# Patient Record
Sex: FEMALE | Race: OTHER | HISPANIC OR LATINO | URBAN - METROPOLITAN AREA
[De-identification: names, ages, dates, MRNs, and addresses within clinical notes are randomized per-mention and may not be internally consistent; named-entity substitution may affect disease eponyms.]

---

## 2017-01-28 VITALS
OXYGEN SATURATION: 100 % | HEART RATE: 95 BPM | HEIGHT: 62 IN | DIASTOLIC BLOOD PRESSURE: 96 MMHG | TEMPERATURE: 98 F | WEIGHT: 132.06 LBS | SYSTOLIC BLOOD PRESSURE: 183 MMHG | RESPIRATION RATE: 16 BRPM

## 2017-01-28 LAB
BASOPHILS # BLD AUTO: 0.1 K/UL — SIGNIFICANT CHANGE UP (ref 0–0.2)
BASOPHILS NFR BLD AUTO: 0.8 % — SIGNIFICANT CHANGE UP (ref 0–2)
EOSINOPHIL # BLD AUTO: 0.5 K/UL — SIGNIFICANT CHANGE UP (ref 0–0.5)
EOSINOPHIL NFR BLD AUTO: 5 % — SIGNIFICANT CHANGE UP (ref 0–6)
HCT VFR BLD CALC: 37.3 % — SIGNIFICANT CHANGE UP (ref 34.5–45)
HGB BLD-MCNC: 13 G/DL — SIGNIFICANT CHANGE UP (ref 11.5–15.5)
LYMPHOCYTES # BLD AUTO: 3.4 K/UL — HIGH (ref 1–3.3)
LYMPHOCYTES # BLD AUTO: 31.8 % — SIGNIFICANT CHANGE UP (ref 13–44)
MCHC RBC-ENTMCNC: 32.3 PG — SIGNIFICANT CHANGE UP (ref 27–34)
MCHC RBC-ENTMCNC: 35 GM/DL — SIGNIFICANT CHANGE UP (ref 32–36)
MCV RBC AUTO: 92.3 FL — SIGNIFICANT CHANGE UP (ref 80–100)
MONOCYTES # BLD AUTO: 1.1 K/UL — HIGH (ref 0–0.9)
MONOCYTES NFR BLD AUTO: 9.9 % — SIGNIFICANT CHANGE UP (ref 2–14)
NEUTROPHILS # BLD AUTO: 5.6 K/UL — SIGNIFICANT CHANGE UP (ref 1.8–7.4)
NEUTROPHILS NFR BLD AUTO: 52.5 % — SIGNIFICANT CHANGE UP (ref 43–77)
PLATELET # BLD AUTO: 214 K/UL — SIGNIFICANT CHANGE UP (ref 150–400)
RBC # BLD: 4.04 M/UL — SIGNIFICANT CHANGE UP (ref 3.8–5.2)
RBC # FLD: 11.8 % — SIGNIFICANT CHANGE UP (ref 10.3–14.5)
WBC # BLD: 10.7 K/UL — HIGH (ref 3.8–10.5)
WBC # FLD AUTO: 10.7 K/UL — HIGH (ref 3.8–10.5)

## 2017-01-28 RX ORDER — ASPIRIN/CALCIUM CARB/MAGNESIUM 324 MG
162 TABLET ORAL ONCE
Qty: 0 | Refills: 0 | Status: DISCONTINUED | OUTPATIENT
Start: 2017-01-28 | End: 2017-01-28

## 2017-01-28 RX ORDER — ASPIRIN/CALCIUM CARB/MAGNESIUM 324 MG
162 TABLET ORAL ONCE
Qty: 0 | Refills: 0 | Status: COMPLETED | OUTPATIENT
Start: 2017-01-28 | End: 2017-01-28

## 2017-01-28 RX ADMIN — Medication 162 MILLIGRAM(S): at 23:42

## 2017-01-28 NOTE — ED PROVIDER NOTE - OBJECTIVE STATEMENT
66 F pmh brain tumor s/p resection presents with 2 days of intermittent chest pressure and shortness of breath.  Chest pressure is not associated with exertion.  Pain is sternal, lasts 2-3 hours.  Pt states pain does not radiate, but states she is experiencing back of the neck pain.  Pt took 2 baby aspirin during the day without relief.  Pt has had prior episodes like this and was worked up and received stress tests that never showed anything.  Pt's father  of MI at age 59.  Pt denies fever/chills, n/v/d, headache, dizziness.    - Lindy Munroe DO

## 2017-01-28 NOTE — ED PROVIDER NOTE - PHYSICAL EXAMINATION
Gen: NAD, AOx3  Head: NCAT  HEENT: PERRL, oral mucosa moist, normal conjunctiva  Lung: CTAB, shallow breathing  CV: rrr, no murmurs, Normal perfusion  Abd: soft, NTND  MSK: No edema, no visible deformities  Neuro: No focal neurologic deficits  Skin: No rash   Psych: anxious  - Lindy Munroe, DO

## 2017-01-28 NOTE — ED ADULT NURSE NOTE - OBJECTIVE STATEMENT
Presents c/o chest tightness x2 days. Pt took BP at home and it was 180/90. Reports feeling a constant pressure and difficulty breathing. Denies ha/numbness/tingling/blurry vision. No n/v/d. Presents c/o chest tightness x2 days. Pt took BP at home and it was 180/90. Reports feeling a constant pressure and difficulty breathing. Denies ha/numbness/tingling/blurry vision. No n/v/d. Pt A&Ox3. Ambulates. C/o persistent chest pressure and diff breathing. Abd soft. Skin WDI.

## 2017-01-28 NOTE — ED PROVIDER NOTE - PROGRESS NOTE DETAILS
------------ATTENDING NOTE------------   signed out to me, chest pain resolved, initial cardiac markers w/o NSTEMI, plan for CDU for continued cardiac markers, stress in AM - Obi Luna MD   ----------------------------------------------------------

## 2017-01-28 NOTE — ED PROVIDER NOTE - ATTENDING CONTRIBUTION TO CARE
66 F pmhx benign brain tumor, s/p resection, presents for 2 days of intermittent chest pressure and shortness of breath.  Pain is sternal, lasts 2-3 hours.  Pt states pain does not radiate, but states she is experiencing back of the neck pain. had stress test in 2013. non smoker, never smoker. Pt denies fever/chills, n/v/d, headache, dizziness  Gen. no acute distress, Non toxic   HEENT: EOMI, mmm,   Lungs: CTAB/L no C/ W /R   CVS: S1S2   Abd; Soft non tender, non distended   Ext: no edema   Neuro AAOx3 non focal clear speech

## 2017-01-29 ENCOUNTER — OUTPATIENT (OUTPATIENT)
Dept: EMERGENCY DEPT | Facility: HOSPITAL | Age: 67
LOS: 1 days | End: 2017-01-29
Payer: MEDICARE

## 2017-01-29 DIAGNOSIS — R07.9 CHEST PAIN, UNSPECIFIED: ICD-10-CM

## 2017-01-29 DIAGNOSIS — D32.0 BENIGN NEOPLASM OF CEREBRAL MENINGES: ICD-10-CM

## 2017-01-29 DIAGNOSIS — D18.02 HEMANGIOMA OF INTRACRANIAL STRUCTURES: Chronic | ICD-10-CM

## 2017-01-29 DIAGNOSIS — I20.0 UNSTABLE ANGINA: ICD-10-CM

## 2017-01-29 DIAGNOSIS — Z29.9 ENCOUNTER FOR PROPHYLACTIC MEASURES, UNSPECIFIED: ICD-10-CM

## 2017-01-29 LAB
ALBUMIN SERPL ELPH-MCNC: 4.3 G/DL — SIGNIFICANT CHANGE UP (ref 3.3–5)
ALP SERPL-CCNC: 102 U/L — SIGNIFICANT CHANGE UP (ref 40–120)
ALT FLD-CCNC: 25 U/L RC — SIGNIFICANT CHANGE UP (ref 10–45)
ANION GAP SERPL CALC-SCNC: 18 MMOL/L — HIGH (ref 5–17)
AST SERPL-CCNC: 51 U/L — HIGH (ref 10–40)
BILIRUB SERPL-MCNC: 0.4 MG/DL — SIGNIFICANT CHANGE UP (ref 0.2–1.2)
BUN SERPL-MCNC: 17 MG/DL — SIGNIFICANT CHANGE UP (ref 7–23)
CALCIUM SERPL-MCNC: 9.9 MG/DL — SIGNIFICANT CHANGE UP (ref 8.4–10.5)
CHLORIDE SERPL-SCNC: 100 MMOL/L — SIGNIFICANT CHANGE UP (ref 96–108)
CHOLEST SERPL-MCNC: 238 MG/DL — HIGH (ref 10–199)
CK MB BLD-MCNC: 0.4 % — SIGNIFICANT CHANGE UP (ref 0–3.5)
CK MB BLD-MCNC: 0.4 % — SIGNIFICANT CHANGE UP (ref 0–3.5)
CK MB CFR SERPL CALC: 4.6 NG/ML — HIGH (ref 0–3.8)
CK MB CFR SERPL CALC: 5.6 NG/ML — HIGH (ref 0–3.8)
CK SERPL-CCNC: 1236 U/L — HIGH (ref 25–170)
CK SERPL-CCNC: 1522 U/L — HIGH (ref 25–170)
CO2 SERPL-SCNC: 23 MMOL/L — SIGNIFICANT CHANGE UP (ref 22–31)
CREAT SERPL-MCNC: 1.05 MG/DL — SIGNIFICANT CHANGE UP (ref 0.5–1.3)
GLUCOSE SERPL-MCNC: 122 MG/DL — HIGH (ref 70–99)
HBA1C BLD-MCNC: 5.9 % — HIGH (ref 4–5.6)
HDLC SERPL-MCNC: 56 MG/DL — SIGNIFICANT CHANGE UP (ref 40–125)
INR BLD: 0.98 RATIO — SIGNIFICANT CHANGE UP (ref 0.88–1.16)
LIPID PNL WITH DIRECT LDL SERPL: 153 MG/DL — HIGH
POTASSIUM SERPL-MCNC: 4.4 MMOL/L — SIGNIFICANT CHANGE UP (ref 3.5–5.3)
POTASSIUM SERPL-SCNC: 4.4 MMOL/L — SIGNIFICANT CHANGE UP (ref 3.5–5.3)
PROT SERPL-MCNC: 7.9 G/DL — SIGNIFICANT CHANGE UP (ref 6–8.3)
PROTHROM AB SERPL-ACNC: 10.7 SEC — SIGNIFICANT CHANGE UP (ref 10–13.1)
SODIUM SERPL-SCNC: 141 MMOL/L — SIGNIFICANT CHANGE UP (ref 135–145)
TOTAL CHOLESTEROL/HDL RATIO MEASUREMENT: 4.2 RATIO — SIGNIFICANT CHANGE UP (ref 3.3–7.1)
TRIGL SERPL-MCNC: 145 MG/DL — SIGNIFICANT CHANGE UP (ref 10–149)
TROPONIN T SERPL-MCNC: <0.01 NG/ML — SIGNIFICANT CHANGE UP (ref 0–0.06)
TROPONIN T SERPL-MCNC: <0.01 NG/ML — SIGNIFICANT CHANGE UP (ref 0–0.06)

## 2017-01-29 RX ORDER — SODIUM CHLORIDE 9 MG/ML
3 INJECTION INTRAMUSCULAR; INTRAVENOUS; SUBCUTANEOUS EVERY 8 HOURS
Qty: 0 | Refills: 0 | Status: DISCONTINUED | OUTPATIENT
Start: 2017-01-29 | End: 2017-01-30

## 2017-01-29 RX ORDER — DEXTROSE 50 % IN WATER 50 %
12.5 SYRINGE (ML) INTRAVENOUS ONCE
Qty: 0 | Refills: 0 | Status: DISCONTINUED | OUTPATIENT
Start: 2017-01-29 | End: 2017-01-29

## 2017-01-29 RX ORDER — INSULIN LISPRO 100/ML
VIAL (ML) SUBCUTANEOUS
Qty: 0 | Refills: 0 | Status: DISCONTINUED | OUTPATIENT
Start: 2017-01-29 | End: 2017-01-29

## 2017-01-29 RX ORDER — ASPIRIN/CALCIUM CARB/MAGNESIUM 324 MG
81 TABLET ORAL DAILY
Qty: 0 | Refills: 0 | Status: DISCONTINUED | OUTPATIENT
Start: 2017-01-29 | End: 2017-01-30

## 2017-01-29 RX ORDER — SODIUM CHLORIDE 9 MG/ML
1000 INJECTION, SOLUTION INTRAVENOUS
Qty: 0 | Refills: 0 | Status: DISCONTINUED | OUTPATIENT
Start: 2017-01-29 | End: 2017-01-29

## 2017-01-29 RX ORDER — DEXTROSE 50 % IN WATER 50 %
1 SYRINGE (ML) INTRAVENOUS ONCE
Qty: 0 | Refills: 0 | Status: DISCONTINUED | OUTPATIENT
Start: 2017-01-29 | End: 2017-01-29

## 2017-01-29 RX ORDER — DEXTROSE 50 % IN WATER 50 %
25 SYRINGE (ML) INTRAVENOUS ONCE
Qty: 0 | Refills: 0 | Status: DISCONTINUED | OUTPATIENT
Start: 2017-01-29 | End: 2017-01-29

## 2017-01-29 RX ORDER — HEPARIN SODIUM 5000 [USP'U]/ML
5000 INJECTION INTRAVENOUS; SUBCUTANEOUS EVERY 12 HOURS
Qty: 0 | Refills: 0 | Status: DISCONTINUED | OUTPATIENT
Start: 2017-01-29 | End: 2017-01-30

## 2017-01-29 RX ADMIN — SODIUM CHLORIDE 3 MILLILITER(S): 9 INJECTION INTRAMUSCULAR; INTRAVENOUS; SUBCUTANEOUS at 23:22

## 2017-01-29 RX ADMIN — SODIUM CHLORIDE 3 MILLILITER(S): 9 INJECTION INTRAMUSCULAR; INTRAVENOUS; SUBCUTANEOUS at 16:30

## 2017-01-29 RX ADMIN — SODIUM CHLORIDE 3 MILLILITER(S): 9 INJECTION INTRAMUSCULAR; INTRAVENOUS; SUBCUTANEOUS at 06:39

## 2017-01-29 NOTE — ED ADULT NURSE REASSESSMENT NOTE - PERIPHERAL VASCULAR WDL
Pulses equal bilaterally, no edema present.

## 2017-01-29 NOTE — H&P ADULT. - PROBLEM SELECTOR PLAN 1
troponin negative x2. Relatively atypical symptoms but abnormal stress test. Plan to have cath tomorrow, possible need for stent. Discussed possibility at great length with patient. Including possible need to start DAPT and statin.  -Cont ASA  -NPO after MN for cath  -F/u cardiology recommendations

## 2017-01-29 NOTE — ED ADULT NURSE REASSESSMENT NOTE - NEURO WDL
Alert and oriented to person, place and time, memory intact, behavior appropriate to situation, PERRL.

## 2017-01-29 NOTE — ED CDU PROVIDER NOTE - OBJECTIVE STATEMENT
65y/o F with pmh brain tumor s/p resection c/o intermittent chest pressure/heaviness and shortness of breath x3 days.  Chest pressure is not associated with exertion.  Pain is sternal, lasts 2-3 hours.  Pt states pain does not radiate, but states she is experiencing back of the neck pain.  Pt took 2 baby aspirin during the day without relief.  Pt has had prior episode like this 4 years ago  and had a negative stress test at that time.  Pt's father  of MI at age 59.  Pt denies fever/chills, n/v/d, headache, dizziness, back pain, abd pain, problems going to the bathroom or walking.

## 2017-01-29 NOTE — ED CDU PROVIDER NOTE - DETAILS
CHEST PAIN  -[facility]  -[facility]  -[facility] EVAL  -STRESS TEST  -CASE D/W ATTENDING Dr. RONI Luna

## 2017-01-29 NOTE — ED CDU PROVIDER NOTE - PLAN OF CARE
1. Recommend Aspirin 81mg over the counter daily until further evaluation.  Take all of your other medications as previously prescribed.   2. Follow up with your PMD and/or cardiologist within 48-72 hours. Show copies of your reports given to you.   3. Worsening or continued chest pain, shortness of breath, weakness, return to ED.

## 2017-01-29 NOTE — ED CDU PROVIDER NOTE - PROGRESS NOTE DETAILS
CDU NOTE VIPIN DALAL: Pt resting comfortably, feeling well without complaint. NAD, VSS. No events on telemetry. CDU NOTE VIPIN YOUNG: Pt resting comfortably, feeling well without complaint. NAD, VSS. No events on telemetry. Awaiting stress test at this time. CDU NOTE VIPIN YOUNG: Pt resting comfortably, feeling well without complaint. NAD, VSS. No events on telemetry. Awaiting second portion of stress test at this time. Received call from Dr. Brandon stating that patient has positive stress test with multiple areas of reversible ischemia shown. Patient will need to be admitted for cardiac catheterization. Discussed case and plan with patient with Dr. Maurice, and will admit to Dr. Hadley for further work-up. Patient in understanding and agreement. -Kaylyn Bartlett PA-C jamel:  I have personally performed a face to face diagnostic evaluation on this patient.  I have reviewed the ACP note and agree with the history, exam, and plan of care.  +  abnormal stress.  pt asymptomatic now.  admit to cardiology. CDU NOTE VIPIN DALAL: Pt resting comfortably, feeling well without complaint. NAD, VSS. No events on telemetry. pt is admitted to medicine team since about 1610.

## 2017-01-29 NOTE — ED ADULT NURSE REASSESSMENT NOTE - MUSCULOSKELETAL WDL
Full range of motion of upper and lower extremities, no joint tenderness/swelling.

## 2017-01-29 NOTE — ED CDU PROVIDER NOTE - FAMILY HISTORY
Father  Still living? Unknown  Family history of heart attack, Age at diagnosis: Age Unknown     Mother  Still living? Unknown  Family history of Alzheimer's disease, Age at diagnosis: Age Unknown

## 2017-01-29 NOTE — ED ADULT NURSE REASSESSMENT NOTE - INTEGUMENTARY WDL
Color consistent with ethnicity/race, warm, dry intact, resilient.

## 2017-01-29 NOTE — ED ADULT NURSE REASSESSMENT NOTE - COMFORT CARE
plan of care explained/po fluids offered
ambulated to bathroom/plan of care explained
plan of care explained

## 2017-01-29 NOTE — H&P ADULT. - FAMILY HISTORY
<<-----Click on this checkbox to enter Family History Family history of heart attack     Father  Still living? Unknown  Family history of Alzheimer's disease, Age at diagnosis: Age Unknown

## 2017-01-29 NOTE — ED CDU PROVIDER NOTE - ATTENDING CONTRIBUTION TO CARE
-----------ATTENDING NOTE------------   signed out to me, chest pain resolved, initial cardiac markers w/o NSTEMI, plan for CDU for continued cardiac markers, stress in AM -- continue to agree w/ PA's documentation and Hx&PE and Assessment/Plan.   - Obi Luna MD   ----------------------------------------------------------

## 2017-01-29 NOTE — ED ADULT NURSE REASSESSMENT NOTE - ANCILLARY STATUS
cardiovascular tests pending/awaiting stress test/EKG at bedside/awaiting lab draw
awaiting radiology
cardiovascular tests pending

## 2017-01-29 NOTE — H&P ADULT. - ASSESSMENT
66F w/ hx of meningioma s/p resection p/w chest pain. Pt states she will occasionally get chest tightness associated with SOB for several minutes not associated with exertion

## 2017-01-29 NOTE — H&P ADULT. - HISTORY OF PRESENT ILLNESS
66F w/ hx of meningioma s/p resection p/w chest pain. Pt states she will occasionally get chest tightness associated with SOB for several minutes not associated with exertion. Pt states these episodes will happen while she is watching TV but not happen while walking up the stairs. 3 days ago pt noted the symptoms persistently for just under 2 hours, became concerned and went to get evaluated. Reportedly negative chest pain work up 4 years ago as per patient. Pt denies any hx of asthma, got inhaler for these symptoms in the past with no significant improvement. No orthopnea, hx of smoking, dizziness, nausea, vomiting, lower extremity edema. Father  of MI late 50s or early 60s.    In ER: Given ASA 162mg

## 2017-01-29 NOTE — ED ADULT NURSE REASSESSMENT NOTE - GENITOURINARY WDL
Bladder non-tender and non-distended. Urine clear yellow.

## 2017-01-29 NOTE — ED ADULT NURSE REASSESSMENT NOTE - RESPIRATORY WDL
Breathing spontaneous and unlabored. Breath sounds clear and equal bilaterally with regular rhythm.

## 2017-01-30 VITALS — DIASTOLIC BLOOD PRESSURE: 75 MMHG | RESPIRATION RATE: 16 BRPM | HEART RATE: 78 BPM | SYSTOLIC BLOOD PRESSURE: 129 MMHG

## 2017-01-30 LAB
ANION GAP SERPL CALC-SCNC: 16 MMOL/L — SIGNIFICANT CHANGE UP (ref 5–17)
BASOPHILS # BLD AUTO: 0.05 K/UL — SIGNIFICANT CHANGE UP (ref 0–0.2)
BASOPHILS NFR BLD AUTO: 0.6 % — SIGNIFICANT CHANGE UP (ref 0–2)
BUN SERPL-MCNC: 17 MG/DL — SIGNIFICANT CHANGE UP (ref 7–23)
CALCIUM SERPL-MCNC: 10.2 MG/DL — SIGNIFICANT CHANGE UP (ref 8.4–10.5)
CHLORIDE SERPL-SCNC: 101 MMOL/L — SIGNIFICANT CHANGE UP (ref 96–108)
CO2 SERPL-SCNC: 23 MMOL/L — SIGNIFICANT CHANGE UP (ref 22–31)
CREAT SERPL-MCNC: 1.06 MG/DL — SIGNIFICANT CHANGE UP (ref 0.5–1.3)
EOSINOPHIL # BLD AUTO: 0.51 K/UL — HIGH (ref 0–0.5)
EOSINOPHIL NFR BLD AUTO: 5.9 % — SIGNIFICANT CHANGE UP (ref 0–6)
GLUCOSE SERPL-MCNC: 114 MG/DL — HIGH (ref 70–99)
HCT VFR BLD CALC: 42.6 % — SIGNIFICANT CHANGE UP (ref 34.5–45)
HGB BLD-MCNC: 14.3 G/DL — SIGNIFICANT CHANGE UP (ref 11.5–15.5)
IMM GRANULOCYTES NFR BLD AUTO: 0.1 % — SIGNIFICANT CHANGE UP (ref 0–1.5)
LYMPHOCYTES # BLD AUTO: 3.19 K/UL — SIGNIFICANT CHANGE UP (ref 1–3.3)
LYMPHOCYTES # BLD AUTO: 37 % — SIGNIFICANT CHANGE UP (ref 13–44)
MAGNESIUM SERPL-MCNC: 2.3 MG/DL — SIGNIFICANT CHANGE UP (ref 1.6–2.6)
MCHC RBC-ENTMCNC: 30.9 PG — SIGNIFICANT CHANGE UP (ref 27–34)
MCHC RBC-ENTMCNC: 33.6 GM/DL — SIGNIFICANT CHANGE UP (ref 32–36)
MCV RBC AUTO: 92 FL — SIGNIFICANT CHANGE UP (ref 80–100)
MONOCYTES # BLD AUTO: 0.82 K/UL — SIGNIFICANT CHANGE UP (ref 0–0.9)
MONOCYTES NFR BLD AUTO: 9.5 % — SIGNIFICANT CHANGE UP (ref 2–14)
NEUTROPHILS # BLD AUTO: 4.05 K/UL — SIGNIFICANT CHANGE UP (ref 1.8–7.4)
NEUTROPHILS NFR BLD AUTO: 46.9 % — SIGNIFICANT CHANGE UP (ref 43–77)
PLATELET # BLD AUTO: 267 K/UL — SIGNIFICANT CHANGE UP (ref 150–400)
POTASSIUM SERPL-MCNC: 4.4 MMOL/L — SIGNIFICANT CHANGE UP (ref 3.5–5.3)
POTASSIUM SERPL-SCNC: 4.4 MMOL/L — SIGNIFICANT CHANGE UP (ref 3.5–5.3)
RBC # BLD: 4.63 M/UL — SIGNIFICANT CHANGE UP (ref 3.8–5.2)
RBC # FLD: 12.6 % — SIGNIFICANT CHANGE UP (ref 10.3–14.5)
SODIUM SERPL-SCNC: 140 MMOL/L — SIGNIFICANT CHANGE UP (ref 135–145)
WBC # BLD: 8.63 K/UL — SIGNIFICANT CHANGE UP (ref 3.8–10.5)
WBC # FLD AUTO: 8.63 K/UL — SIGNIFICANT CHANGE UP (ref 3.8–10.5)

## 2017-01-30 PROCEDURE — 93005 ELECTROCARDIOGRAM TRACING: CPT | Mod: 76

## 2017-01-30 PROCEDURE — C1887: CPT

## 2017-01-30 PROCEDURE — 93017 CV STRESS TEST TRACING ONLY: CPT

## 2017-01-30 PROCEDURE — C1894: CPT

## 2017-01-30 PROCEDURE — 85027 COMPLETE CBC AUTOMATED: CPT

## 2017-01-30 PROCEDURE — 71045 X-RAY EXAM CHEST 1 VIEW: CPT

## 2017-01-30 PROCEDURE — 82550 ASSAY OF CK (CPK): CPT

## 2017-01-30 PROCEDURE — 85610 PROTHROMBIN TIME: CPT

## 2017-01-30 PROCEDURE — 80061 LIPID PANEL: CPT

## 2017-01-30 PROCEDURE — 80048 BASIC METABOLIC PNL TOTAL CA: CPT

## 2017-01-30 PROCEDURE — G0378: CPT

## 2017-01-30 PROCEDURE — C1769: CPT

## 2017-01-30 PROCEDURE — 99285 EMERGENCY DEPT VISIT HI MDM: CPT | Mod: 25

## 2017-01-30 PROCEDURE — 80053 COMPREHEN METABOLIC PANEL: CPT

## 2017-01-30 PROCEDURE — 83036 HEMOGLOBIN GLYCOSYLATED A1C: CPT

## 2017-01-30 PROCEDURE — 71250 CT THORAX DX C-: CPT | Mod: 26

## 2017-01-30 PROCEDURE — 82553 CREATINE MB FRACTION: CPT

## 2017-01-30 PROCEDURE — A9500: CPT

## 2017-01-30 PROCEDURE — 84484 ASSAY OF TROPONIN QUANT: CPT

## 2017-01-30 PROCEDURE — 83735 ASSAY OF MAGNESIUM: CPT

## 2017-01-30 PROCEDURE — 93458 L HRT ARTERY/VENTRICLE ANGIO: CPT

## 2017-01-30 PROCEDURE — 78452 HT MUSCLE IMAGE SPECT MULT: CPT

## 2017-01-30 PROCEDURE — 71250 CT THORAX DX C-: CPT

## 2017-01-30 RX ORDER — CLOPIDOGREL BISULFATE 75 MG/1
600 TABLET, FILM COATED ORAL ONCE
Qty: 0 | Refills: 0 | Status: COMPLETED | OUTPATIENT
Start: 2017-01-30 | End: 2017-01-30

## 2017-01-30 RX ORDER — ATORVASTATIN CALCIUM 80 MG/1
20 TABLET, FILM COATED ORAL AT BEDTIME
Qty: 0 | Refills: 0 | Status: DISCONTINUED | OUTPATIENT
Start: 2017-01-30 | End: 2017-01-30

## 2017-01-30 RX ORDER — METOPROLOL TARTRATE 50 MG
1 TABLET ORAL
Qty: 30 | Refills: 1 | OUTPATIENT
Start: 2017-01-30 | End: 2017-03-30

## 2017-01-30 RX ORDER — ATORVASTATIN CALCIUM 80 MG/1
1 TABLET, FILM COATED ORAL
Qty: 0 | Refills: 0 | COMMUNITY
Start: 2017-01-30

## 2017-01-30 RX ORDER — METOPROLOL TARTRATE 50 MG
1 TABLET ORAL
Qty: 0 | Refills: 0 | COMMUNITY
Start: 2017-01-30

## 2017-01-30 RX ORDER — METOPROLOL TARTRATE 50 MG
25 TABLET ORAL DAILY
Qty: 0 | Refills: 0 | Status: DISCONTINUED | OUTPATIENT
Start: 2017-01-30 | End: 2017-01-30

## 2017-01-30 RX ORDER — ACETAMINOPHEN 500 MG
650 TABLET ORAL ONCE
Qty: 0 | Refills: 0 | Status: COMPLETED | OUTPATIENT
Start: 2017-01-30 | End: 2017-01-30

## 2017-01-30 RX ADMIN — HEPARIN SODIUM 5000 UNIT(S): 5000 INJECTION INTRAVENOUS; SUBCUTANEOUS at 06:11

## 2017-01-30 RX ADMIN — Medication 650 MILLIGRAM(S): at 20:21

## 2017-01-30 RX ADMIN — SODIUM CHLORIDE 3 MILLILITER(S): 9 INJECTION INTRAMUSCULAR; INTRAVENOUS; SUBCUTANEOUS at 06:15

## 2017-01-30 RX ADMIN — Medication 81 MILLIGRAM(S): at 10:15

## 2017-01-30 RX ADMIN — Medication 25 MILLIGRAM(S): at 12:44

## 2017-01-30 RX ADMIN — SODIUM CHLORIDE 3 MILLILITER(S): 9 INJECTION INTRAMUSCULAR; INTRAVENOUS; SUBCUTANEOUS at 15:01

## 2017-01-30 RX ADMIN — CLOPIDOGREL BISULFATE 600 MILLIGRAM(S): 75 TABLET, FILM COATED ORAL at 10:15

## 2017-01-30 NOTE — DISCHARGE NOTE ADULT - MEDICATION SUMMARY - MEDICATIONS TO TAKE
I will START or STAY ON the medications listed below when I get home from the hospital:    aspirin 81 mg oral tablet, chewable  -- 1 tab(s) by mouth once a day  -- Indication: For Caediac Protection     metoprolol succinate 25 mg oral tablet, extended release  -- 1 tab(s) by mouth once a day  -- Indication: For Hypertension     Cod Liver oral oil  -- 5 milliliter(s) by mouth once a day  -- Indication: For Supplement     Centrum Women's  -- 1 tab(s) by mouth once a day  -- Indication: For Supplement I will START or STAY ON the medications listed below when I get home from the hospital:    aspirin 81 mg oral tablet, chewable  -- 1 tab(s) by mouth once a day  -- Indication: For Caediac Protection     metoprolol succinate 25 mg oral tablet, extended release  -- 1 tab(s) by mouth once a day MDD:i tab  -- Indication: For Hypertension     Cod Liver oral oil  -- 5 milliliter(s) by mouth once a day  -- Indication: For Supplement     Centrum Women's  -- 1 tab(s) by mouth once a day  -- Indication: For Supplement

## 2017-01-30 NOTE — DISCHARGE NOTE ADULT - PLAN OF CARE
you will be free of chest pain Low salt, low fat diet.   Weight management.   Take medications as prescribed.    No smoking.  Follow up appointments with your doctor(s)  as instructed No heavy lifting, strenuous activity, bending, straining or unnecessary stair climbing  for 2 weeks. No sex for 1 week.  No driving for 2 days. You may shower 24 hours following procedure but avoid baths and swimming for 1 week. Check groin site for bleeding and/or swelling daily following procedure. Call your doctor/cardiologist immediately should it occur or if you have increased/persistent pain at the site. Follow up with your cardiologist in 1- 2 weeks. You may call [facility] at [phone] or [phone] after office hours and weekends  with any questions or concerns following your procedure. Take medications as prescribed.

## 2017-01-30 NOTE — DISCHARGE NOTE ADULT - CARE PLAN
Principal Discharge DX:	Chest pain with minimal risk for cardiac etiology  Goal:	you will be free of chest pain  Instructions for follow-up, activity and diet:	Low salt, low fat diet.   Weight management.   Take medications as prescribed.    No smoking.  Follow up appointments with your doctor(s)  as instructed Principal Discharge DX:	Chest pain with minimal risk for cardiac etiology  Goal:	you will be free of chest pain  Instructions for follow-up, activity and diet:	No heavy lifting, strenuous activity, bending, straining or unnecessary stair climbing  for 2 weeks. No sex for 1 week.  No driving for 2 days. You may shower 24 hours following procedure but avoid baths and swimming for 1 week. Check groin site for bleeding and/or swelling daily following procedure. Call your doctor/cardiologist immediately should it occur or if you have increased/persistent pain at the site. Follow up with your cardiologist in 1- 2 weeks. You may call [facility] at [phone] or [phone] after office hours and weekends  with any questions or concerns following your procedure. Take medications as prescribed. Principal Discharge DX:	Chest pain with minimal risk for cardiac etiology  Goal:	you will be free of chest pain  Instructions for follow-up, activity and diet:	No heavy lifting, strenuous activity, bending, straining or unnecessary stair climbing  for 2 weeks. No sex for 1 week.  No driving for 2 days. You may shower 24 hours following procedure but avoid baths and swimming for 1 week. Check groin site for bleeding and/or swelling daily following procedure. Call your doctor/cardiologist immediately should it occur or if you have increased/persistent pain at the site. Follow up with your cardiologist in 1- 2 weeks. You may call [facility] at [phone] or [phone] after office hours and weekends  with any questions or concerns following your procedure. Take medications as prescribed. Principal Discharge DX:	Chest pain with minimal risk for cardiac etiology  Goal:	you will be free of chest pain  Instructions for follow-up, activity and diet:	No heavy lifting, strenuous activity, bending, straining or unnecessary stair climbing  for 2 weeks. No sex for 1 week.  No driving for 2 days. You may shower 24 hours following procedure but avoid baths and swimming for 1 week. Check groin site for bleeding and/or swelling daily following procedure. Call your doctor/cardiologist immediately should it occur or if you have increased/persistent pain at the site. Follow up with your cardiologist in 1- 2 weeks. You may call [facility] at [phone] or [phone] after office hours and weekends  with any questions or concerns following your procedure. Take medications as prescribed. Principal Discharge DX:	Chest pain with minimal risk for cardiac etiology  Goal:	you will be free of chest pain  Instructions for follow-up, activity and diet:	No heavy lifting, strenuous activity, bending, straining or unnecessary stair climbing  for 2 weeks. No sex for 1 week.  No driving for 2 days. You may shower 24 hours following procedure but avoid baths and swimming for 1 week. Check groin site for bleeding and/or swelling daily following procedure. Call your doctor/cardiologist immediately should it occur or if you have increased/persistent pain at the site. Follow up with your cardiologist in 1- 2 weeks. You may call [facility] at [phone] or [phone] after office hours and weekends  with any questions or concerns following your procedure. Take medications as prescribed. Principal Discharge DX:	Chest pain with minimal risk for cardiac etiology  Goal:	you will be free of chest pain  Instructions for follow-up, activity and diet:	No heavy lifting, strenuous activity, bending, straining or unnecessary stair climbing  for 2 weeks. No sex for 1 week.  No driving for 2 days. You may shower 24 hours following procedure but avoid baths and swimming for 1 week. Check groin site for bleeding and/or swelling daily following procedure. Call your doctor/cardiologist immediately should it occur or if you have increased/persistent pain at the site. Follow up with your cardiologist in 1- 2 weeks. You may call [facility] at [phone] or [phone] after office hours and weekends  with any questions or concerns following your procedure. Take medications as prescribed. Principal Discharge DX:	Chest pain with minimal risk for cardiac etiology  Goal:	you will be free of chest pain  Instructions for follow-up, activity and diet:	No heavy lifting, strenuous activity, bending, straining or unnecessary stair climbing  for 2 weeks. No sex for 1 week.  No driving for 2 days. You may shower 24 hours following procedure but avoid baths and swimming for 1 week. Check groin site for bleeding and/or swelling daily following procedure. Call your doctor/cardiologist immediately should it occur or if you have increased/persistent pain at the site. Follow up with your cardiologist in 1- 2 weeks. You may call [facility] at [phone] or [phone] after office hours and weekends  with any questions or concerns following your procedure. Take medications as prescribed. Principal Discharge DX:	Chest pain with minimal risk for cardiac etiology  Goal:	you will be free of chest pain  Instructions for follow-up, activity and diet:	No heavy lifting, strenuous activity, bending, straining or unnecessary stair climbing  for 2 weeks. No sex for 1 week.  No driving for 2 days. You may shower 24 hours following procedure but avoid baths and swimming for 1 week. Check groin site for bleeding and/or swelling daily following procedure. Call your doctor/cardiologist immediately should it occur or if you have increased/persistent pain at the site. Follow up with your cardiologist in 1- 2 weeks. You may call [facility] at [phone] or [phone] after office hours and weekends  with any questions or concerns following your procedure. Take medications as prescribed.

## 2017-01-30 NOTE — DISCHARGE NOTE ADULT - ADDITIONAL INSTRUCTIONS
No heavy lifting, strenuous activity, bending, straining, or unnecessary stair climbing for 2 weeks. No driving for 2 days. You may shower 24 hours following the procedure but avoid baths/swimming for 1 week. Check your groin site for bleeding and/or swelling daily following procedure and call your doctor immediately if it occurs or if you experience increased pain at the site. Follow up with your cardiologist in 1-2 weeks. You may call [facility]  if you have any questions/concerns regarding your procedure [phone]. No heavy lifting, strenuous activity, bending, straining, or unnecessary stair climbing for 2 weeks. No driving for 2 days. You may shower 24 hours following the procedure but avoid baths/swimming for 1 week. Check your groin site for bleeding and/or swelling daily following procedure and call your doctor immediately if it occurs or if you experience increased pain at the site. Follow up with Dr. Hadley on Feb 16th at 1pm. You may call [facility]  if you have any questions/concerns regarding your procedure [phone]. No heavy lifting, strenuous activity, bending, straining, or unnecessary stair climbing for 2 weeks. No driving for 2 days. You may shower 24 hours following the procedure but avoid baths/swimming for 1 week. Check your groin site for bleeding and/or swelling daily following procedure and call your doctor immediately if it occurs or if you experience increased pain at the site. Follow up with Dr. Hadley on Feb 16th at 1pm. You may call [facility]  if you have any questions/concerns regarding your procedure [phone]. CTA Chest preliminary report  revealed no emergent finding. Per Dr Jomar fonseca d/c patient home tonight No heavy lifting, strenuous activity, bending, straining, or unnecessary stair climbing for 2 weeks. No driving for 2 days. You may shower 24 hours following the procedure but avoid baths/swimming for 1 week. Check your groin site for bleeding and/or swelling daily following procedure and call your doctor immediately if it occurs or if you experience increased pain at the site. Follow up with Dr. Hadley on Feb 16th at 1pm. You may call [facility]  if you have any questions/concerns regarding your procedure [phone]. CTA Chest preliminary report  revealed no emergent finding. Dr Hadley made aware. Per Dr. Toni fonseca d/c patient home tonight.

## 2017-01-30 NOTE — DISCHARGE NOTE ADULT - CONDITION (STATED IN TERMS THAT PERMIT A SPECIFIC MEASURABLE COMPARISON WITH CONDITION ON ADMISSION):
vital signs are stable at time of discharge. Patient denies chest pain, palpitaions, SOB upon discharge. Groin site stable, warm to touch, +sensation, +2 DP pulse vital signs are stable at time of discharge. Patient denies chest pain, palpitations, SOB upon discharge. Groin site stable, warm to touch, +sensation, +2 DP pulse

## 2017-01-30 NOTE — ED ADULT NURSE REASSESSMENT NOTE - NS ED NURSE REASSESS COMMENT FT1
0700 report taken from change of shift RN. Pt admitted waiting bed assignment for pos stress test. Pt NPO. Pt ambulatory to bathroom. Will continue to monitor.
Pt received from DINO Tristan. Pt oriented to CDU & plan of care was discussed. No complaints of chest pain, SOB, dizziness or palpitations. Safety & comfort measures maintained. Call bell in reach. Will continue to monitor.
Received pt from Sarah RN CDU , received pt alert and responsive, oriented x4, denies any respiratory distress, SOB, or difficulty breathing. Pt transferred to CDU for observation for chest pain , 18 g IV in left arm in place, patent and free of signs of infiltration, placed on continuos cardiac monitoring as ordered, NSR HR in the 70's,  pt denies chest pain or palpitations, V/S stable, pt afebrile, pt denies pain at this time. Pt educated on unit and unit rules, instructed patient to notify RN of any needed assistance, Pt verbalizes understanding, Call bell placed within reach. Safety maintained. Will continue to monitor.
Pt VSS, pt resting comfortably.  Pt awaitng stress test.

## 2017-01-30 NOTE — DISCHARGE NOTE ADULT - HOSPITAL COURSE
66F w/ hx of meningioma s/p resection p/w chest pain. Pt states she will occasionally get chest tightness associated with SOB for several minutes not associated with exertion. Pt states these episodes will happen while she is watching TV but not happen while walking up the stairs. 3 days ago pt noted the symptoms persistently for just under 2 hours, became concerned and went to get evaluated. Reportedly negative chest pain work up 4 years ago as per patient. Pt denies any hx of asthma, got inhaler for these symptoms in the past with no significant improvement. No orthopnea, hx of smoking, dizziness, nausea, vomiting, lower extremity edema. Father  of MI late 50s or early 60s.    s/p diagnostic cath via right femoral artery. Patient tolerated procedure well, no complications.

## 2017-01-30 NOTE — DISCHARGE NOTE ADULT - PATIENT PORTAL LINK FT
“You can access the Follow[employer] Patient Portal, offered by [facility], by registering with the following website: [email or web address]”

## 2017-01-30 NOTE — DISCHARGE NOTE ADULT - CARE PROVIDER_API CALL
Obi Hadley (MD), Cardiovascular Disease; Internal Medicine; Interventional Cardiology; Nuclear Cardiology  [address]  [place] [address]  Phone: [phone]  Fax: [phone]

## 2017-02-02 RX ORDER — IBUPROFEN 200 MG
1 TABLET ORAL
Qty: 0 | Refills: 0 | COMMUNITY

## 2017-06-18 ENCOUNTER — INPATIENT (INPATIENT)
Facility: HOSPITAL | Age: 67
LOS: 2 days | Discharge: HOME | DRG: 392 | End: 2017-06-21
Attending: GENERAL PRACTICE | Admitting: GENERAL PRACTICE
Payer: MEDICARE

## 2017-06-18 VITALS
TEMPERATURE: 98 F | OXYGEN SATURATION: 100 % | HEART RATE: 90 BPM | RESPIRATION RATE: 20 BRPM | SYSTOLIC BLOOD PRESSURE: 180 MMHG | DIASTOLIC BLOOD PRESSURE: 100 MMHG

## 2017-06-18 DIAGNOSIS — D18.02 HEMANGIOMA OF INTRACRANIAL STRUCTURES: Chronic | ICD-10-CM

## 2017-06-18 DIAGNOSIS — R10.84 GENERALIZED ABDOMINAL PAIN: ICD-10-CM

## 2017-06-18 DIAGNOSIS — K62.5 HEMORRHAGE OF ANUS AND RECTUM: ICD-10-CM

## 2017-06-18 DIAGNOSIS — K52.9 NONINFECTIVE GASTROENTERITIS AND COLITIS, UNSPECIFIED: ICD-10-CM

## 2017-06-18 DIAGNOSIS — E87.2 ACIDOSIS: ICD-10-CM

## 2017-06-18 DIAGNOSIS — D72.828 OTHER ELEVATED WHITE BLOOD CELL COUNT: ICD-10-CM

## 2017-06-18 DIAGNOSIS — A09 INFECTIOUS GASTROENTERITIS AND COLITIS, UNSPECIFIED: ICD-10-CM

## 2017-06-18 LAB
ALBUMIN SERPL ELPH-MCNC: 4.1 G/DL — SIGNIFICANT CHANGE UP (ref 3.3–5)
ALBUMIN SERPL ELPH-MCNC: 4.5 G/DL — SIGNIFICANT CHANGE UP (ref 3.3–5)
ALP SERPL-CCNC: 109 U/L — SIGNIFICANT CHANGE UP (ref 40–120)
ALP SERPL-CCNC: 98 U/L — SIGNIFICANT CHANGE UP (ref 40–120)
ALT FLD-CCNC: 15 U/L RC — SIGNIFICANT CHANGE UP (ref 10–45)
ALT FLD-CCNC: 23 U/L RC — SIGNIFICANT CHANGE UP (ref 10–45)
ANION GAP SERPL CALC-SCNC: 20 MMOL/L — HIGH (ref 5–17)
ANION GAP SERPL CALC-SCNC: 21 MMOL/L — HIGH (ref 5–17)
APPEARANCE UR: CLEAR — SIGNIFICANT CHANGE UP
AST SERPL-CCNC: 27 U/L — SIGNIFICANT CHANGE UP (ref 10–40)
AST SERPL-CCNC: 41 U/L — HIGH (ref 10–40)
BACTERIA # UR AUTO: ABNORMAL /HPF
BASE EXCESS BLDV CALC-SCNC: 0.3 MMOL/L — SIGNIFICANT CHANGE UP (ref -2–2)
BASOPHILS # BLD AUTO: 0 K/UL — SIGNIFICANT CHANGE UP (ref 0–0.2)
BASOPHILS NFR BLD AUTO: 0 % — SIGNIFICANT CHANGE UP (ref 0–2)
BILIRUB SERPL-MCNC: 0.6 MG/DL — SIGNIFICANT CHANGE UP (ref 0.2–1.2)
BILIRUB SERPL-MCNC: 0.6 MG/DL — SIGNIFICANT CHANGE UP (ref 0.2–1.2)
BILIRUB UR-MCNC: NEGATIVE — SIGNIFICANT CHANGE UP
BLD GP AB SCN SERPL QL: NEGATIVE — SIGNIFICANT CHANGE UP
BUN SERPL-MCNC: 19 MG/DL — SIGNIFICANT CHANGE UP (ref 7–23)
BUN SERPL-MCNC: 21 MG/DL — SIGNIFICANT CHANGE UP (ref 7–23)
CA-I SERPL-SCNC: 1.04 MMOL/L — LOW (ref 1.12–1.3)
CALCIUM SERPL-MCNC: 8.7 MG/DL — SIGNIFICANT CHANGE UP (ref 8.4–10.5)
CALCIUM SERPL-MCNC: 9.6 MG/DL — SIGNIFICANT CHANGE UP (ref 8.4–10.5)
CHLORIDE BLDV-SCNC: 102 MMOL/L — SIGNIFICANT CHANGE UP (ref 96–108)
CHLORIDE SERPL-SCNC: 94 MMOL/L — LOW (ref 96–108)
CHLORIDE SERPL-SCNC: 98 MMOL/L — SIGNIFICANT CHANGE UP (ref 96–108)
CO2 BLDV-SCNC: 26 MMOL/L — SIGNIFICANT CHANGE UP (ref 22–30)
CO2 SERPL-SCNC: 20 MMOL/L — LOW (ref 22–31)
CO2 SERPL-SCNC: 21 MMOL/L — LOW (ref 22–31)
COLOR SPEC: YELLOW — SIGNIFICANT CHANGE UP
COMMENT - URINE: SIGNIFICANT CHANGE UP
CREAT SERPL-MCNC: 0.89 MG/DL — SIGNIFICANT CHANGE UP (ref 0.5–1.3)
CREAT SERPL-MCNC: 0.96 MG/DL — SIGNIFICANT CHANGE UP (ref 0.5–1.3)
DIFF PNL FLD: ABNORMAL
EOSINOPHIL # BLD AUTO: 0 K/UL — SIGNIFICANT CHANGE UP (ref 0–0.5)
EOSINOPHIL NFR BLD AUTO: 0 % — SIGNIFICANT CHANGE UP (ref 0–6)
EPI CELLS # UR: SIGNIFICANT CHANGE UP /HPF
GAS PNL BLDV: 136 MMOL/L — SIGNIFICANT CHANGE UP (ref 136–145)
GAS PNL BLDV: SIGNIFICANT CHANGE UP
GLUCOSE BLDV-MCNC: 150 MG/DL — HIGH (ref 70–99)
GLUCOSE SERPL-MCNC: 184 MG/DL — HIGH (ref 70–99)
GLUCOSE SERPL-MCNC: 200 MG/DL — HIGH (ref 70–99)
GLUCOSE UR QL: 250
HCO3 BLDV-SCNC: 24 MMOL/L — SIGNIFICANT CHANGE UP (ref 21–29)
HCT VFR BLD CALC: 39.7 % — SIGNIFICANT CHANGE UP (ref 34.5–45)
HCT VFR BLDA CALC: 42 % — SIGNIFICANT CHANGE UP (ref 39–50)
HGB BLD CALC-MCNC: 13.5 G/DL — SIGNIFICANT CHANGE UP (ref 11.5–15.5)
HGB BLD-MCNC: 13.2 G/DL — SIGNIFICANT CHANGE UP (ref 11.5–15.5)
KETONES UR-MCNC: ABNORMAL
LACTATE BLDV-MCNC: 3.1 MMOL/L — HIGH (ref 0.7–2)
LEUKOCYTE ESTERASE UR-ACNC: NEGATIVE — SIGNIFICANT CHANGE UP
LIDOCAIN IGE QN: 36 U/L — SIGNIFICANT CHANGE UP (ref 7–60)
LYMPHOCYTES # BLD AUTO: 0.6 K/UL — LOW (ref 1–3.3)
LYMPHOCYTES # BLD AUTO: 3.5 % — LOW (ref 13–44)
MCHC RBC-ENTMCNC: 31 PG — SIGNIFICANT CHANGE UP (ref 27–34)
MCHC RBC-ENTMCNC: 33.3 GM/DL — SIGNIFICANT CHANGE UP (ref 32–36)
MCV RBC AUTO: 93.3 FL — SIGNIFICANT CHANGE UP (ref 80–100)
MONOCYTES # BLD AUTO: 0.4 K/UL — SIGNIFICANT CHANGE UP (ref 0–0.9)
MONOCYTES NFR BLD AUTO: 2.5 % — SIGNIFICANT CHANGE UP (ref 2–14)
NEUTROPHILS # BLD AUTO: 16.7 K/UL — HIGH (ref 1.8–7.4)
NEUTROPHILS NFR BLD AUTO: 94 % — HIGH (ref 43–77)
NITRITE UR-MCNC: NEGATIVE — SIGNIFICANT CHANGE UP
PCO2 BLDV: 41 MMHG — SIGNIFICANT CHANGE UP (ref 35–50)
PH BLDV: 7.4 — SIGNIFICANT CHANGE UP (ref 7.35–7.45)
PH UR: 8 — SIGNIFICANT CHANGE UP (ref 5–8)
PLATELET # BLD AUTO: 248 K/UL — SIGNIFICANT CHANGE UP (ref 150–400)
PO2 BLDV: 20 MMHG — LOW (ref 25–45)
POTASSIUM BLDV-SCNC: 3.8 MMOL/L — SIGNIFICANT CHANGE UP (ref 3.5–5)
POTASSIUM SERPL-MCNC: 3.8 MMOL/L — SIGNIFICANT CHANGE UP (ref 3.5–5.3)
POTASSIUM SERPL-MCNC: 4.6 MMOL/L — SIGNIFICANT CHANGE UP (ref 3.5–5.3)
POTASSIUM SERPL-SCNC: 3.8 MMOL/L — SIGNIFICANT CHANGE UP (ref 3.5–5.3)
POTASSIUM SERPL-SCNC: 4.6 MMOL/L — SIGNIFICANT CHANGE UP (ref 3.5–5.3)
PROT SERPL-MCNC: 7.6 G/DL — SIGNIFICANT CHANGE UP (ref 6–8.3)
PROT SERPL-MCNC: 8.5 G/DL — HIGH (ref 6–8.3)
PROT UR-MCNC: 100 MG/DL
RBC # BLD: 4.26 M/UL — SIGNIFICANT CHANGE UP (ref 3.8–5.2)
RBC # FLD: 11.5 % — SIGNIFICANT CHANGE UP (ref 10.3–14.5)
RBC CASTS # UR COMP ASSIST: ABNORMAL /HPF (ref 0–2)
RH IG SCN BLD-IMP: POSITIVE — SIGNIFICANT CHANGE UP
SAO2 % BLDV: 29 % — LOW (ref 67–88)
SODIUM SERPL-SCNC: 136 MMOL/L — SIGNIFICANT CHANGE UP (ref 135–145)
SODIUM SERPL-SCNC: 138 MMOL/L — SIGNIFICANT CHANGE UP (ref 135–145)
SP GR SPEC: 1.01 — SIGNIFICANT CHANGE UP (ref 1.01–1.02)
UROBILINOGEN FLD QL: NEGATIVE — SIGNIFICANT CHANGE UP
WBC # BLD: 17.8 K/UL — HIGH (ref 3.8–10.5)
WBC # FLD AUTO: 17.8 K/UL — HIGH (ref 3.8–10.5)
WBC UR QL: SIGNIFICANT CHANGE UP /HPF (ref 0–5)

## 2017-06-18 PROCEDURE — 74174 CTA ABD&PLVS W/CONTRAST: CPT | Mod: 26

## 2017-06-18 PROCEDURE — 99222 1ST HOSP IP/OBS MODERATE 55: CPT

## 2017-06-18 PROCEDURE — 71275 CT ANGIOGRAPHY CHEST: CPT | Mod: 26

## 2017-06-18 PROCEDURE — 71010: CPT | Mod: 26

## 2017-06-18 PROCEDURE — 99285 EMERGENCY DEPT VISIT HI MDM: CPT | Mod: GC

## 2017-06-18 RX ORDER — CIPROFLOXACIN LACTATE 400MG/40ML
400 VIAL (ML) INTRAVENOUS EVERY 12 HOURS
Qty: 0 | Refills: 0 | Status: DISCONTINUED | OUTPATIENT
Start: 2017-06-18 | End: 2017-06-21

## 2017-06-18 RX ORDER — ONDANSETRON 8 MG/1
4 TABLET, FILM COATED ORAL EVERY 6 HOURS
Qty: 0 | Refills: 0 | Status: DISCONTINUED | OUTPATIENT
Start: 2017-06-18 | End: 2017-06-21

## 2017-06-18 RX ORDER — METRONIDAZOLE 500 MG
500 TABLET ORAL EVERY 8 HOURS
Qty: 0 | Refills: 0 | Status: DISCONTINUED | OUTPATIENT
Start: 2017-06-18 | End: 2017-06-21

## 2017-06-18 RX ORDER — ACETAMINOPHEN 500 MG
1000 TABLET ORAL ONCE
Qty: 0 | Refills: 0 | Status: COMPLETED | OUTPATIENT
Start: 2017-06-18 | End: 2017-06-18

## 2017-06-18 RX ORDER — METRONIDAZOLE 500 MG
500 TABLET ORAL ONCE
Qty: 0 | Refills: 0 | Status: COMPLETED | OUTPATIENT
Start: 2017-06-18 | End: 2017-06-18

## 2017-06-18 RX ORDER — SODIUM CHLORIDE 9 MG/ML
1000 INJECTION INTRAMUSCULAR; INTRAVENOUS; SUBCUTANEOUS
Qty: 0 | Refills: 0 | Status: DISCONTINUED | OUTPATIENT
Start: 2017-06-18 | End: 2017-06-21

## 2017-06-18 RX ORDER — ONDANSETRON 8 MG/1
4 TABLET, FILM COATED ORAL ONCE
Qty: 0 | Refills: 0 | Status: COMPLETED | OUTPATIENT
Start: 2017-06-18 | End: 2017-06-18

## 2017-06-18 RX ORDER — PANTOPRAZOLE SODIUM 20 MG/1
40 TABLET, DELAYED RELEASE ORAL DAILY
Qty: 0 | Refills: 0 | Status: DISCONTINUED | OUTPATIENT
Start: 2017-06-18 | End: 2017-06-21

## 2017-06-18 RX ORDER — MULTIVIT-MIN/FERROUS GLUCONATE 9 MG/15 ML
1 LIQUID (ML) ORAL
Qty: 0 | Refills: 0 | COMMUNITY

## 2017-06-18 RX ORDER — MORPHINE SULFATE 50 MG/1
4 CAPSULE, EXTENDED RELEASE ORAL ONCE
Qty: 0 | Refills: 0 | Status: DISCONTINUED | OUTPATIENT
Start: 2017-06-18 | End: 2017-06-18

## 2017-06-18 RX ORDER — CIPROFLOXACIN LACTATE 400MG/40ML
400 VIAL (ML) INTRAVENOUS ONCE
Qty: 0 | Refills: 0 | Status: COMPLETED | OUTPATIENT
Start: 2017-06-18 | End: 2017-06-18

## 2017-06-18 RX ORDER — FAMOTIDINE 10 MG/ML
20 INJECTION INTRAVENOUS ONCE
Qty: 0 | Refills: 0 | Status: COMPLETED | OUTPATIENT
Start: 2017-06-18 | End: 2017-06-18

## 2017-06-18 RX ORDER — MORPHINE SULFATE 50 MG/1
2 CAPSULE, EXTENDED RELEASE ORAL
Qty: 0 | Refills: 0 | Status: DISCONTINUED | OUTPATIENT
Start: 2017-06-18 | End: 2017-06-21

## 2017-06-18 RX ORDER — SODIUM CHLORIDE 9 MG/ML
2000 INJECTION INTRAMUSCULAR; INTRAVENOUS; SUBCUTANEOUS ONCE
Qty: 0 | Refills: 0 | Status: COMPLETED | OUTPATIENT
Start: 2017-06-18 | End: 2017-06-18

## 2017-06-18 RX ADMIN — Medication 100 MILLIGRAM(S): at 15:46

## 2017-06-18 RX ADMIN — MORPHINE SULFATE 2 MILLIGRAM(S): 50 CAPSULE, EXTENDED RELEASE ORAL at 20:04

## 2017-06-18 RX ADMIN — Medication 400 MILLIGRAM(S): at 11:00

## 2017-06-18 RX ADMIN — ONDANSETRON 4 MILLIGRAM(S): 8 TABLET, FILM COATED ORAL at 12:42

## 2017-06-18 RX ADMIN — SODIUM CHLORIDE 2000 MILLILITER(S): 9 INJECTION INTRAMUSCULAR; INTRAVENOUS; SUBCUTANEOUS at 11:00

## 2017-06-18 RX ADMIN — ONDANSETRON 4 MILLIGRAM(S): 8 TABLET, FILM COATED ORAL at 11:00

## 2017-06-18 RX ADMIN — Medication 200 MILLIGRAM(S): at 14:08

## 2017-06-18 RX ADMIN — MORPHINE SULFATE 4 MILLIGRAM(S): 50 CAPSULE, EXTENDED RELEASE ORAL at 13:27

## 2017-06-18 RX ADMIN — FAMOTIDINE 20 MILLIGRAM(S): 10 INJECTION INTRAVENOUS at 11:00

## 2017-06-18 RX ADMIN — Medication 1000 MILLIGRAM(S): at 12:42

## 2017-06-18 RX ADMIN — MORPHINE SULFATE 4 MILLIGRAM(S): 50 CAPSULE, EXTENDED RELEASE ORAL at 15:46

## 2017-06-18 RX ADMIN — SODIUM CHLORIDE 75 MILLILITER(S): 9 INJECTION INTRAMUSCULAR; INTRAVENOUS; SUBCUTANEOUS at 17:05

## 2017-06-18 RX ADMIN — MORPHINE SULFATE 2 MILLIGRAM(S): 50 CAPSULE, EXTENDED RELEASE ORAL at 20:49

## 2017-06-18 NOTE — ED PROVIDER NOTE - MEDICAL DECISION MAKING DETAILS
66 y.o. female pw severe ab pain, nausea, vomiting, and hematochezia. Ill appearing, uncomfortable. Will check labs, cts, analgesia, fluids, reevaluate.

## 2017-06-18 NOTE — H&P ADULT - PROBLEM SELECTOR PLAN 3
food poisoning vs infectious colitis  check stool studies/culture/cdiff  GI eval called  NPO for now

## 2017-06-18 NOTE — CONSULT NOTE ADULT - SUBJECTIVE AND OBJECTIVE BOX
GENERAL SURGERY CONSULT NOTE  --------------------------------------------------------------------------------------------    Patient is a 66y old  Female who presents with a chief complaint of abdominal pain and BRBPR.    HPI: 66F with PSH of meningioma presented to ER with abdominal pain and BRBPR. Pt reports that after dinner last night (steak, shrimp, clams, spinach) she started having epigastric pressure/cramping pain that spread throughout her abdomen. Pain was a 9/10 and constant. She had 6-7 episodes of bloody BM and 6-7 episodes of NBNB emesis. She had intermittent chills throughout the night. No fevers. Colonoscopy was normal >5 years ago. ROS otherwise negative.     Pt takes Advil (1-2 pills every 2-3 days) for pain from a lipoma. She recently started taking Advil "a few months ago."     ROS: 10-system review is otherwise negative except HPI above.      PAST MEDICAL & SURGICAL HISTORY:  Cerebral Meningioma: s/p resection   New onset hypertension  Abdominoplasty  Breast Reduction    FAMILY HISTORY:  Family history of Alzheimer&#x27;s disease  Family history of heart attack  No pertinent family history in first degree relatives    [x] Family history not pertinent as reviewed with the patient and family    SOCIAL HISTORY:  No smoking or drug history. Pt drinks alcohol once/week. 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     ALLERGIES: NKDA  Pt has an "unknown food allergy." She knows that she is allergic to a certain food, but can't remember which one.     HOME MEDICATIONS:   Aspirin 81 mg  Vitamin D 1000 units daily    CURRENT MEDICATIONS  MEDICATIONS (STANDING):   MEDICATIONS (PRN):  --------------------------------------------------------------------------------------------    Vitals:   T(C): 36.7, Max: 37 ( @ 11:05)  HR: 88 (81 - 90)  BP: 182/82 (158/83 - 185/95)  BP(mean): --  RR: 20 (20 - 20)  SpO2: 98% (98% - 100%)  Wt(kg): --  CAPILLARY BLOOD GLUCOSE    CAPILLARY BLOOD GLUCOSE      PHYSICAL EXAM:  General: NAD, Lying in bed comfortably  Neuro: CNs, motor/sensory intact  Cardio: RRR  Resp: Good effort, CTA b/l  GI/Abd: Soft, nondistended, tender in epigastric and LLQ. no rebound or guarding. no palpable masses.   Vascular: All 4 extremities warm.  Skin: Intact, no breakdown  --------------------------------------------------------------------------------------------    LABS  CBC ( @ 11:36)                              13.2                           17.8<H>  )----------------(  248        94.0<H>% Neutrophils, 3.5<L>% Lymphocytes, ANC: 16.7<H>                              39.7      BMP ( @ 11:36)             138     |  98      |  19    		Ca++ --      Ca 8.7                ---------------------------------( 200<H>		Mg --                 3.8     |  20<L>   |  0.89  			Ph --      BMP ( @ 11:01)             136     |  94<L>   |  21    		Ca++ --      Ca 9.6                ---------------------------------( 184<H>		Mg --                 4.6     |  21<L>   |  0.96  			Ph --        LFTs (:36)      TPro 7.6 / Alb 4.1 / TBili 0.6 / DBili -- / AST 27 / ALT 15 / AlkPhos 98  LFTs (:)      TPro 8.5<H> / Alb 4.5 / TBili 0.6 / DBili -- / AST 41<H> / ALT 23 / AlkPhos 109      Cardiac Markers (:)     Trop: <0.01 -- / CKMB: -- / CK: 109      VBG (:)     7.48<H> / 31<L> / 25 / 23 / 0.5 / 39<L>%     Lactate: 4.5<HH>  VBG (:)     7.50<H> / 32<L> / 31 / 24 / 2.2<H> / 62<L>%     Lactate: 4.2<HH>    --------------------------------------------------------------------------------------------    MICROBIOLOGY  Urinalysis ( 11:):     Color: Yellow / Appearance: Clear / S.014 / pH: 8.0 / Gluc: 250<!> / Ketones: Small<!> / Bili: Negative / Urobili: Negative / Protein :100<!> / Nitrites: Negative / Leuk.Est: Negative / RBC: 25-50<!> / WBC: 3-5 / Sq Epi:  / Non Sq Epi: OCC / Bacteria Few<!>   Few Mucus Strands      --------------------------------------------------------------------------------------------    IMAGING  IMPRESSION:  1.  Long segment bowel wall thickening involving the descending colon   consistent with colitis.  2.  No evidence of thoracic or abdominal aortic aneurysm or dissection. GENERAL SURGERY CONSULT NOTE  --------------------------------------------------------------------------------------------    Patient is a 66y old  Female who presents with a chief complaint of abdominal pain and BRBPR.    HPI: 66F with PSH of meningioma presented to ER with abdominal pain and BRBPR. Pt reports that after dinner last night (steak, shrimp, clams, spinach) she started having epigastric pressure/cramping pain that spread throughout her abdomen. Pain was a 9/10 and constant. She had 6-7 episodes of bloody BM and 6-7 episodes of NBNB emesis. She had intermittent chills throughout the night. No fevers. Colonoscopy was normal >5 years ago. ROS otherwise negative.     Pt takes Advil (1-2 pills every 2-3 days) for pain from a lipoma. She recently started taking Advil "a few months ago."     ROS: 10-system review is otherwise negative except HPI above.      PAST MEDICAL & SURGICAL HISTORY:  Cerebral Meningioma: s/p resection   New onset hypertension  Abdominoplasty  Breast Reduction    FAMILY HISTORY:  Family history of Alzheimer&#x27;s disease  Family history of heart attack  No pertinent family history in first degree relatives    [x] Family history not pertinent as reviewed with the patient and family    SOCIAL HISTORY:  No smoking or drug history. Pt drinks alcohol once/week. 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     ALLERGIES: NKDA  Pt has an "unknown food allergy." She knows that she is allergic to a certain food, but can't remember which one.     HOME MEDICATIONS:   Aspirin 81 mg  Vitamin D 1000 units daily    CURRENT MEDICATIONS  MEDICATIONS (STANDING):   MEDICATIONS (PRN):  --------------------------------------------------------------------------------------------    Vitals:   T(C): 36.7, Max: 37 ( @ 11:05)  HR: 88 (81 - 90)  BP: 182/82 (158/83 - 185/95)  BP(mean): --  RR: 20 (20 - 20)  SpO2: 98% (98% - 100%)  Wt(kg): --  CAPILLARY BLOOD GLUCOSE    CAPILLARY BLOOD GLUCOSE      PHYSICAL EXAM:  General: NAD, Lying in bed comfortably  Neuro: CNs, motor/sensory intact  Cardio: RRR  Resp: Good effort, CTA b/l  GI/Abd: Soft, nondistended, tender in epigastric and LLQ. no rebound or guarding. no palpable masses.   Vascular: All 4 extremities warm.  Skin: Intact, no breakdown  Psych: normal mood and affect    --------------------------------------------------------------------------------------------    LABS  CBC ( @ 11:36)                              13.2                           17.8<H>  )----------------(  248        94.0<H>% Neutrophils, 3.5<L>% Lymphocytes, ANC: 16.7<H>                              39.7      BMP ( @ 11:36)             138     |  98      |  19    		Ca++ --      Ca 8.7                ---------------------------------( 200<H>		Mg --                 3.8     |  20<L>   |  0.89  			Ph --      BMP ( @ 11:01)             136     |  94<L>   |  21    		Ca++ --      Ca 9.6                ---------------------------------( 184<H>		Mg --                 4.6     |  21<L>   |  0.96  			Ph --        LFTs (:36)      TPro 7.6 / Alb 4.1 / TBili 0.6 / DBili -- / AST 27 / ALT 15 / AlkPhos 98  LFTs (:)      TPro 8.5<H> / Alb 4.5 / TBili 0.6 / DBili -- / AST 41<H> / ALT 23 / AlkPhos 109      Cardiac Markers (:36)     Trop: <0.01 -- / CKMB: -- / CK: 109      VBG (:36)     7.48<H> / 31<L> / 25 / 23 / 0.5 / 39<L>%     Lactate: 4.5<HH>  VBG (:)     7.50<H> / 32<L> / 31 / 24 / 2.2<H> / 62<L>%     Lactate: 4.2<HH>    --------------------------------------------------------------------------------------------    MICROBIOLOGY  Urinalysis ( 11:):     Color: Yellow / Appearance: Clear / S.014 / pH: 8.0 / Gluc: 250<!> / Ketones: Small<!> / Bili: Negative / Urobili: Negative / Protein :100<!> / Nitrites: Negative / Leuk.Est: Negative / RBC: 25-50<!> / WBC: 3-5 / Sq Epi:  / Non Sq Epi: OCC / Bacteria Few<!>   Few Mucus Strands      --------------------------------------------------------------------------------------------    IMAGING  IMPRESSION:  1.  Long segment bowel wall thickening involving the descending colon   consistent with colitis.  2.  No evidence of thoracic or abdominal aortic aneurysm or dissection.

## 2017-06-18 NOTE — ED PROVIDER NOTE - PHYSICAL EXAMINATION
Gen: ill appearing, uncomfortable in obvious pain, AOx3  Head: NCAT  HEENT: PERRL, MMM, normal conjunctiva  Lung: CTAB, no rales, rhonchi or wheezing  CV: S1/S2, no murmurs, rubs or gallops  Abd: soft, diffusely tender, +guarding, no rebound  MSK: No CVA tenderness. No edema, no visible deformities  Neuro: No focal neurologic deficits. CN II-XII intact. Normal strength and sensation x 4  Skin: Warm and dry, no evidence of rash  Psych: normal mood and affect

## 2017-06-18 NOTE — H&P ADULT - PROBLEM SELECTOR PLAN 1
related to colitis ..  hemoglobin stable: Hemoglobin:  06-18 @ 11:36   13.2  close monitoring of H/H

## 2017-06-18 NOTE — CONSULT NOTE ADULT - PROBLEM SELECTOR RECOMMENDATION 9
-NPO  -Pain Control  -Stool for culture, ova, parasites  -Antibiotics  -GI consult  -Will follow  -Admit to medicine  -Discussed with attending

## 2017-06-18 NOTE — ED PROVIDER NOTE - OBJECTIVE STATEMENT
66 y.o. female previously healthy pw acute onset severe ab pain, nausea/vomiting, and brbpr started last night after eating dinner out with  who is symptomatic. Pt denies priors. Did not take anything for pain. Pain is diffuse, non radiating, sharp.

## 2017-06-18 NOTE — H&P ADULT - ATTENDING COMMENTS
>>>>>>Medical Attending Initial / Admission note<<<<<<  -------------------------------------------------------------------------------    CHIEF COMPLAINT & HISTORY OF PRESENT ILLNESS:      Patient is a 66 y.o. woman with no significant PMH, presents with severe ab pain, nausea/vomiting, and bloody BM; started about two days  ago.   Pt had dinner with family (sea food and steak) and went home, started with BRBPR X1 with normal BM, which turned to diarrhea with nausea sn vomiting (still nauseous), and worsening abdominal pain.  Pt denies priors. Did not take anything for pain. Pain is diffuse, non radiating, sharp.  In ED found to have pan colitis on CT  last colonoscopy was 6 years ago    --------------------------------------------------------------------------------  PAST MEDICAL HISTORY:  None reported    --------------------------------------------------------------------------------  PAST SURGICAL HISTORY:  -breast reduction  -abdominoplasty  -"benign brain tumor" / meningioma resection    --------------------------------------------------------------------------------  FAMILY HISTORY:  no cancer history  -father: had MI  --------------------------------------------------------------------------------  SOCIAL HISTORY:  Alcohol: None  Smoking: None    --------------------------------------------------------------------------------  ALLERGIES:    [As above, reviewed]    --------------------------------------------------------------------------------  MEDICATIONS:   [As above, reviewed]    --------------------------------------------------------------------------------  REVIEW OF SYSTEM:    GEN: no fever, no chills, + abd pain  RESP: no SOB, no cough, no sputum  CVS: no chest pain, no palpitations, no edema  GI: + abdominal pain, + nausea, no vomiting, no constipation, no diarrhea at this time!  : no dysurea, no frequency, no hematurea  Neuro: no headache, no dizziness  PSYCH: no anxiety, no depression  Derm : no itching, no rash    --------------------------------------------------------------------------------  VITAL SIGNS:   [As above, reviewed]    --------------------------------------------------------------------------------  PHYSICAL EXAM:    GEN: A&O X 3 , NAD , mild discomfort  HEENT: NCAT, PERRL, MMM, hearing intact  Neck: supple , no JVD  CVS: S1S2 , regular , No M/R/G appreciated  PULM: CTA B/L,  no W/R/R appreciated  ABD.: soft. mild midabdomianl tenderness, non distended,  bowel sounds decreased  Extrem: intact pulses , no edema   Derm: No rash , no ecchymoses  PSYCH : normal mood,  no delusion not anxious    --------------------------------------------------------------------------------    LAB AND IMAGING:   [As above, reviewed]    --------------------------------------------------------------------------------    ASSESSMENT AND PLAN:     [As above]      --------------------  Case discussed with Pt  ___________________________  HTamika Coleman D.O.  Pager: [phone]

## 2017-06-18 NOTE — ED ADULT NURSE NOTE - OBJECTIVE STATEMENT
66 y.o female presents ambulatory to the er c.o epigastric pain since last night. hx of htn no medications. states last night she ate dinner out, felt sharp epigastric pain when she got home, had one episode of stool with blood in the bowl, states it was bright red and "filled the bowl". feeling nauseous with intermittent non bloddy vomiting with belching, dizziness/ lightheadedness/ sob/abdominal pain and decreased PO intake. since last night pt has not been able to eat or drink. denies chest pain/fever/chills/rashes, no recent travel . pt is ambulatory with one assist, abdomen is soft, non distended, increased pain with palpation over the epigastric region.  at the bedside. Patient undressed and placed into gown, call bell in hand and side rails up for safety. warm blanket provided, vital signs stable, pt in no acute distress.

## 2017-06-18 NOTE — ED ADULT NURSE REASSESSMENT NOTE - NS ED NURSE REASSESS COMMENT FT1
continuing to discuss vital signs with medical team. md otoole states no interventions are needed at this time. fluids administered per order, pt denies pain at this time. denies the need for medication or assistance. resting comfortably in bed in no distress at this time. has not been able to give stool sample. not actively stooling.
discussed with MD Mays about the bp. bp documented and pt is asymptomatic.  states that no interventions are necessary at this time.
discussing with patient and MD Ch plan of care. pt educated on diagnosis and medications needed. pt understands. c.o epigastric discomfort but states the morphine helped her pain.
patient is resting in the room. patient was just received from [facility] rn. patient states having abdominal pain. will notify md and medicate as per md orders. denies any other complaints. vss/nad. will continue to monitor.
pt still c.o of discomfort in her epigastric area, moving around her b/l lower ribs. pt taken to ct scan, medicated with zofran for nausea prior to leaving [place].
pt resting comfortably and walking around room into chair and into stretcher.   pt has bedside commode, states she is unable to give stool sample. she is not actively stooling  no lightheadedness / dizziness stated. states her abdominal pain is coming back, pain medication ordered, would like to wait a half hour.   pt back into bed with side rails up and call bell in hand, warm blankets provided. no further questions at this time.

## 2017-06-18 NOTE — ED PROVIDER NOTE - NS ED ROS FT
ROS: denies HA, weakness, dizziness, fevers/chills, chest pain, SOB, diaphoresis, back/neck pain, dysuria/hematuria, or rash  +ab pain, nausea

## 2017-06-18 NOTE — ED PROVIDER NOTE - ATTENDING CONTRIBUTION TO CARE
****ATTENDING**** 67yo f no pmhx presents with acute onset of abdominal pain last night. Pain is sharp diffuse increased epigastric. Associated with BRBPR. No fever or chills. Pt had seafood w family no other person w symptoms. No recent travel. No cp, palp, sob. On exam, Patient is awake,alert,oriented x 3. Patient is well appearing and in mild distress. Patient's chest is clear to ausculation, +s1s2. Abdomen is soft nd/ +mild epigastric tenderness no rebound or guarding +BS. Extremity with no swelling or calf tenderness.   EKG reviewed. Check Labs, TS, Lactate, Surgery consult for acute abdomen. VS HTN b/l equal BP. Closely monitor patient.

## 2017-06-18 NOTE — CONSULT NOTE ADULT - ATTENDING COMMENTS
Patient with mildly tender abdominal exam. CT images reviewed, colitis mostly in descending colon. Minimal atherosclerotic disease, most likely etiology is infectious colitis.  - GI consult  - IV abx  - check stool culture, O+P  - will follow along

## 2017-06-19 LAB
ANION GAP SERPL CALC-SCNC: 11 MMOL/L — SIGNIFICANT CHANGE UP (ref 5–17)
BASOPHILS # BLD AUTO: 0.1 K/UL — SIGNIFICANT CHANGE UP (ref 0–0.2)
BASOPHILS NFR BLD AUTO: 0.3 % — SIGNIFICANT CHANGE UP (ref 0–2)
BUN SERPL-MCNC: 13 MG/DL — SIGNIFICANT CHANGE UP (ref 7–23)
CALCIUM SERPL-MCNC: 8.9 MG/DL — SIGNIFICANT CHANGE UP (ref 8.4–10.5)
CHLORIDE SERPL-SCNC: 101 MMOL/L — SIGNIFICANT CHANGE UP (ref 96–108)
CO2 SERPL-SCNC: 26 MMOL/L — SIGNIFICANT CHANGE UP (ref 22–31)
CREAT SERPL-MCNC: 1.17 MG/DL — SIGNIFICANT CHANGE UP (ref 0.5–1.3)
CULTURE RESULTS: NO GROWTH — SIGNIFICANT CHANGE UP
EOSINOPHIL # BLD AUTO: 0.1 K/UL — SIGNIFICANT CHANGE UP (ref 0–0.5)
EOSINOPHIL NFR BLD AUTO: 0.5 % — SIGNIFICANT CHANGE UP (ref 0–6)
GLUCOSE SERPL-MCNC: 120 MG/DL — HIGH (ref 70–99)
HCT VFR BLD CALC: 36.7 % — SIGNIFICANT CHANGE UP (ref 34.5–45)
HGB BLD-MCNC: 12.7 G/DL — SIGNIFICANT CHANGE UP (ref 11.5–15.5)
LYMPHOCYTES # BLD AUTO: 11 % — LOW (ref 13–44)
LYMPHOCYTES # BLD AUTO: 2.1 K/UL — SIGNIFICANT CHANGE UP (ref 1–3.3)
MCHC RBC-ENTMCNC: 32.7 PG — SIGNIFICANT CHANGE UP (ref 27–34)
MCHC RBC-ENTMCNC: 34.7 GM/DL — SIGNIFICANT CHANGE UP (ref 32–36)
MCV RBC AUTO: 94.2 FL — SIGNIFICANT CHANGE UP (ref 80–100)
MONOCYTES # BLD AUTO: 1.3 K/UL — HIGH (ref 0–0.9)
MONOCYTES NFR BLD AUTO: 6.9 % — SIGNIFICANT CHANGE UP (ref 2–14)
NEUTROPHILS # BLD AUTO: 15.7 K/UL — HIGH (ref 1.8–7.4)
NEUTROPHILS NFR BLD AUTO: 81.3 % — HIGH (ref 43–77)
PLAT MORPH BLD: NORMAL — SIGNIFICANT CHANGE UP
PLATELET # BLD AUTO: 211 K/UL — SIGNIFICANT CHANGE UP (ref 150–400)
POTASSIUM SERPL-MCNC: 3.5 MMOL/L — SIGNIFICANT CHANGE UP (ref 3.5–5.3)
POTASSIUM SERPL-SCNC: 3.5 MMOL/L — SIGNIFICANT CHANGE UP (ref 3.5–5.3)
RBC # BLD: 3.9 M/UL — SIGNIFICANT CHANGE UP (ref 3.8–5.2)
RBC # FLD: 11.5 % — SIGNIFICANT CHANGE UP (ref 10.3–14.5)
RBC BLD AUTO: NORMAL — SIGNIFICANT CHANGE UP
SODIUM SERPL-SCNC: 138 MMOL/L — SIGNIFICANT CHANGE UP (ref 135–145)
SPECIMEN SOURCE: SIGNIFICANT CHANGE UP
WBC # BLD: 19.3 K/UL — HIGH (ref 3.8–10.5)
WBC # FLD AUTO: 19.3 K/UL — HIGH (ref 3.8–10.5)

## 2017-06-19 RX ORDER — POTASSIUM CHLORIDE 20 MEQ
40 PACKET (EA) ORAL EVERY 4 HOURS
Qty: 0 | Refills: 0 | Status: COMPLETED | OUTPATIENT
Start: 2017-06-19 | End: 2017-06-19

## 2017-06-19 RX ORDER — LACTOBACILLUS ACIDOPHILUS 100MM CELL
1 CAPSULE ORAL
Qty: 0 | Refills: 0 | Status: DISCONTINUED | OUTPATIENT
Start: 2017-06-19 | End: 2017-06-21

## 2017-06-19 RX ORDER — ACETAMINOPHEN 500 MG
650 TABLET ORAL EVERY 6 HOURS
Qty: 0 | Refills: 0 | Status: DISCONTINUED | OUTPATIENT
Start: 2017-06-19 | End: 2017-06-21

## 2017-06-19 RX ADMIN — Medication 100 MILLIGRAM(S): at 13:04

## 2017-06-19 RX ADMIN — SODIUM CHLORIDE 75 MILLILITER(S): 9 INJECTION INTRAMUSCULAR; INTRAVENOUS; SUBCUTANEOUS at 01:04

## 2017-06-19 RX ADMIN — Medication 1 TABLET(S): at 18:23

## 2017-06-19 RX ADMIN — Medication 40 MILLIEQUIVALENT(S): at 22:17

## 2017-06-19 RX ADMIN — SODIUM CHLORIDE 75 MILLILITER(S): 9 INJECTION INTRAMUSCULAR; INTRAVENOUS; SUBCUTANEOUS at 18:25

## 2017-06-19 RX ADMIN — PANTOPRAZOLE SODIUM 40 MILLIGRAM(S): 20 TABLET, DELAYED RELEASE ORAL at 13:04

## 2017-06-19 RX ADMIN — Medication 100 MILLIGRAM(S): at 01:04

## 2017-06-19 RX ADMIN — Medication 650 MILLIGRAM(S): at 13:05

## 2017-06-19 RX ADMIN — Medication 200 MILLIGRAM(S): at 05:45

## 2017-06-19 RX ADMIN — Medication 100 MILLIGRAM(S): at 22:17

## 2017-06-19 RX ADMIN — Medication 40 MILLIEQUIVALENT(S): at 18:23

## 2017-06-19 RX ADMIN — Medication 100 MILLIGRAM(S): at 05:44

## 2017-06-19 RX ADMIN — Medication 650 MILLIGRAM(S): at 14:00

## 2017-06-19 RX ADMIN — Medication 200 MILLIGRAM(S): at 17:43

## 2017-06-19 NOTE — CONSULT NOTE ADULT - ASSESSMENT
ABDOMINAL PAIN MOST PROBABLY DUE TO COLITIS  ABX  PROLONGED QTC CHECK POTASSIUM AND MAG KEEP K>4  DVT PROPHYLAXIS  REPEAT ECG IN AM  IVF  DC METOPROLOL

## 2017-06-19 NOTE — CONSULT NOTE ADULT - SUBJECTIVE AND OBJECTIVE BOX
Chief Complaint:  Patient is a 66y old  Female who presents with a chief complaint of rectal bleed, abd pain (2017 15:28)    HPI:  Patient is a 66 F w/ PMHX cerebral meningioma admitted for hematochezia/rectal bleeding x 2 days - following a meal of seafood -  patient has been experiencing multiple episodes of loose stool w/ brbpr associated with initially moderate epigastric discomfort. Last episode of rectal bleeding over 5 hours ago today. She has never had bloody stool before. She is not on any A/C at home. She follows w/ Dr. Annabelle Bo who performed a colonoscopy for her 5 years ago (negative -- never had egd)  No associated nausea/vomiting/melena/fever/chills/antibiotic use/sick contacts/recent travel      PAST MEDICAL & SURGICAL HISTORY:  Cerebral Meningioma: s/p resection   Benign cerebral hemangioma: excision  No significant past surgical history  No significant past surgical history      Previous Diagnostic Testing:    EGD: Never had     Colonoscopy: 5 years ago -- negative     Home Medications: ----    Current Medications:    ondansetron Injectable 4milliGRAM(s) IV Push every 6 hours PRN  morphine  - Injectable 2milliGRAM(s) IV Push every 3 hours PRN  ciprofloxacin   IVPB 400milliGRAM(s) IV Intermittent every 12 hours  metroNIDAZOLE  IVPB 500milliGRAM(s) IV Intermittent every 8 hours  sodium chloride 0.9%. 1000milliLiter(s) IV Continuous <Continuous>  pantoprazole  Injectable 40milliGRAM(s) IV Push daily        FAMILY HISTORY:  Family history of Alzheimer&#x27;s disease  Family history of heart attack  No pertinent family history in first degree relatives      Social History: Marital Status:  Lives With: Spouse Substance Abuse: None Smoker: Unknown     Allergies    No Known Allergies    Intolerances    -----    Review of Systems:    General:  No wt loss, fevers, chills, night sweats, fatigue   Eyes:  Good vision, no reported pain  ENT:  No sore throat, pain, runny nose, dysphagia  CV:  No pain, palpitations hypo/hypertension  Resp:  No dyspnea, cough, tachypnea, wheezing  :  No pain, bleeding, incontinence, nocturia  Muscle:  No pain, weakness  Neuro:  No weakness, tingling, memory problems  Psych:  No fatigue, insomnia, mood problems, depression  Endocrine:  No polyuria, polydipsia cold/heat intolerance  Heme:  No petechiae, ecchymosis, easy bruisability  Skin:  No rash, tattoos, scars, edema    Physical Exam:    Vital Signs:  Vital Signs Last 24 Hrs  T(C): 36.7, Max: 36.9 ( @ 15:50)  T(F): 98.1, Max: 98.5 ( @ 15:50)  HR: 100 (83 - 119)  BP: 121/84 (121/84 - 185/95)  BP(mean): --  RR: 19 (17 - 20)  SpO2: 100% (97% - 100%)  Daily     Daily     General:  Appears stated age, well-groomed, well-nourished, no distress  HEENT:  NC/AT,  conjunctivae clear and pink, no thyromegaly, nodules, adenopathy, no JVD  Chest:  Full & symmetric excursion, no increased effort, breath sounds clear  Cardiovascular:  Regular rhythm, S1, S2, no murmur/rub/S3/S4, no abdominal bruit, no edema  Abdomen:  Soft, non-tender, non-distended, normoactive bowel sounds,  no masses ,no hepatosplenomeagaly, no signs of chronic liver disease  Extremities:  no cyanosis,clubbing or edema  Skin:  No rash/erythema/ecchymoses/petechiae/wounds/abscess/warm/dry  Neuro/Psych:  Alert, oriented, no asterixis, no tremor, no encephalopathy      Imaging:      CT ABDOMEN AND PELVIS:     LIVER: Left lobe cyst measuring 7 mm.  BILE DUCTS: Normal caliber.  GALLBLADDER: Within normal limits.  SPLEEN: Within normal limits.  PANCREAS: Within normal limits.  ADRENALS: Within normal limits.  KIDNEYS/URETERS: Left pelvic kidney. No evidence of hydronephrosis.    BLADDER: Within normal limits.  REPRODUCTIVE ORGANS: The uterus and adnexa are within normal limits.    BOWEL: Bowel wall enhancement and edema of the descending colon through   the splenic flecture representing long segment colitis. Appendix is not   visualized.  PERITONEUM: No ascites.  VESSELS:  Mild aortic atherosclerosis. No evidence of abdominal aortic   aneurysm or dissection.  RETROPERITONEUM: No lymphadenopathy.    ABDOMINAL WALL: Within normal limits.  BONES: Mild degenerative changes of the visualized spine.    IMPRESSION:  1.  Long segment bowel wall thickening involving the descending colon   consistent with colitis.  2.  No evidence of thoracic or abdominal aortic aneurysm or dissection.    Laboratory:      138  |  98  |  19  ----------------------------<  200<H>  3.8   |  20<L>  |  0.89    Ca    8.7      2017 11:36    TPro  7.6  /  Alb  4.1  /  TBili  0.6  /  DBili  x   /  AST  27  /  ALT  15  /  AlkPhos  98                            13.2   17.8  )-----------( 248      ( 2017 11:36 )             39.7         LIVER FUNCTIONS - ( 2017 11:36 )  Alb: 4.1 g/dL / Pro: 7.6 g/dL / ALK PHOS: 98 U/L / ALT: 15 U/L RC / AST: 27 U/L / GGT: x             Urinalysis Basic - ( 2017 11:01 )    Color: Yellow / Appearance: Clear / S.014 / pH: x  Gluc: x / Ketone: Small  / Bili: Negative / Urobili: Negative   Blood: x / Protein: 100 mg/dL / Nitrite: Negative   Leuk Esterase: Negative / RBC: 25-50 /HPF / WBC 3-5 /HPF   Sq Epi: x / Non Sq Epi: OCC /HPF / Bacteria: Few /HPF

## 2017-06-19 NOTE — CONSULT NOTE ADULT - PROBLEM SELECTOR RECOMMENDATION 9
- CT ABD results noted  - Continue cipro IVPB q 12 H / flagyl IVPB q 8 H  - Pain control prn / zofran 4 mg iv q 6 h prn  - PPI 40 mg IV qd  - Diet: Full liquid  - Check stool studies / ova and parasites / fobt / asca / anca / c diff pending  - Advance diet as tolerated, monitor stools

## 2017-06-19 NOTE — CONSULT NOTE ADULT - SUBJECTIVE AND OBJECTIVE BOX
CHIEF COMPLAINT:Patient is a 66y old  Female who presents with a chief complaint of "I had a really bad stomach ache, they told me it was food poisoning." (19 Jun 2017 13:50)      HPI:  pt is a 67 yo female with hx of htn who presented to er with c/o abdominal/chest pain after she ate.  pt denies of any hx of cad,+jang.  pt had a cta to ro aortic dissection.  ecg with ns stt change and prolonged qt.        PAST MEDICAL & SURGICAL HISTORY:  Cerebral Meningioma: s/p resection 2002  Benign cerebral hemangioma: excision  No significant past surgical history  No significant past surgical history      MEDICATIONS  (STANDING):  ciprofloxacin   IVPB 400milliGRAM(s) IV Intermittent every 12 hours  metroNIDAZOLE  IVPB 500milliGRAM(s) IV Intermittent every 8 hours  sodium chloride 0.9%. 1000milliLiter(s) IV Continuous <Continuous>  pantoprazole  Injectable 40milliGRAM(s) IV Push daily  lactobacillus acidophilus 1Tablet(s) Oral three times a day with meals    MEDICATIONS  (PRN):  ondansetron Injectable 4milliGRAM(s) IV Push every 6 hours PRN Nausea  morphine  - Injectable 2milliGRAM(s) IV Push every 3 hours PRN Moderate Pain (4 - 6)  acetaminophen   Tablet. 650milliGRAM(s) Oral every 6 hours PRN Moderate Pain (4 - 6)      FAMILY HISTORY:  Family history of Alzheimer&#x27;s disease  Family history of heart attack  No pertinent family history in first degree relatives      SOCIAL HISTORY:    [ ] Non-smoker  [ ] Smoker  [ ] Alcohol    Allergies    No Known Allergies    Intolerances    	    REVIEW OF SYSTEMS:  CONSTITUTIONAL: No fever, weight loss, or fatigue  EYES: No eye pain, visual disturbances, or discharge  ENT:  No difficulty hearing, tinnitus, vertigo; No sinus or throat pain  NECK: No pain or stiffness  RESPIRATORY: No cough, wheezing, chills or hemoptysis; + Shortness of Breath on exertion  CARDIOVASCULAR: No chest pain, palpitations, passing out, dizziness, or leg swelling  GASTROINTESTINAL: + abdominal or epigastric pain. + nausea, vomiting, or hematemesis; No diarrhea or constipation. No melena or hematochezia.+brbpr  GENITOURINARY: No dysuria, frequency, hematuria, or incontinence  NEUROLOGICAL: No headaches, memory loss, loss of strength, numbness, or tremors  SKIN: No itching, burning, rashes, or lesions   LYMPH Nodes: No enlarged glands  ENDOCRINE: No heat or cold intolerance; No hair loss  MUSCULOSKELETAL: No joint pain or swelling; No muscle, back, or extremity pain  PSYCHIATRIC: No depression, anxiety, mood swings, or difficulty sleeping  HEME/LYMPH: No easy bruising, or bleeding gums  ALLERGY AND IMMUNOLOGIC: No hives or eczema	    [ ] All others negative	  [ ] Unable to obtain    PHYSICAL EXAM:  T(C): 37, Max: 37 (06-19 @ 15:30)  HR: 93 (88 - 119)  BP: 93/58 (93/58 - 171/97)  RR: 18 (17 - 20)  SpO2: 96% (96% - 100%)  Wt(kg): --  I&O's Summary      Appearance: Normal	  HEENT:   Normal oral mucosa, PERRL, EOMI	  Lymphatic: No lymphadenopathy  Cardiovascular: Normal S1 S2, No JVD, No murmurs, No edema  Respiratory: Lungs clear to auscultation	  Psychiatry: A & O x 3, Mood & affect appropriate  Gastrointestinal:  Soft, Non-tender, + BS	  Skin: No rashes, No ecchymoses, No cyanosis	  Neurologic: Non-focal  Extremities: Normal range of motion, No clubbing, cyanosis or edema  Vascular: Peripheral pulses palpable 2+ bilaterally    TELEMETRY: 	    ECG  :Ventricular Rate 87 BPM    Atrial Rate 87 BPM    P-R Interval 130 ms    QRS Duration 60 ms     ms    QTc 495 ms    P Axis 68 degrees    R Axis 46 degrees    T Axis 52 degrees    Diagnosis Line NORMAL SINUS RHYTHM WITH SINUS ARRHYTHMIA  NONSPECIFIC T WAVE ABNORMALITY  PROLONGED QT  ABNORMAL ECG  OTHER: 	  	  LABS:	 	    CARDIAC MARKERS:  CARDIAC MARKERS ( 18 Jun 2017 11:36 )  x     / <0.01 ng/mL / 109 U/L / x     / 2.4 ng/mL      CTA/CHEST ABDOMEN           1.  Long segment bowel wall thickening involving the descending colon   consistent with colitis.  2.  No evidence of thoracic or abdominal aortic aneurysm or dissection.             13.2   17.8  )-----------( 248      ( 18 Jun 2017 11:36 )             39.7     06-18    138  |  98  |  19  ----------------------------<  200<H>  3.8   |  20<L>  |  0.89    Ca    8.7      18 Jun 2017 11:36    TPro  7.6  /  Alb  4.1  /  TBili  0.6  /  DBili  x   /  AST  27  /  ALT  15  /  AlkPhos  98  06-18    proBNP:   Lipid Profile:   HgA1c:   TSH:     PREVIOUS DIAGNOSTIC TESTING:    [ ] Echocardiogram:    1. Endocardial visualization enhanced with intravenous  injection of echo contrast (Definity). Normal left  ventricular systolic function. Mild diastolic dysfunction  (Stage I).  2. Normal right ventricular size and systolic function.  3. Minimal tricuspid regurgitation. Estimated pulmonary  artery systolic pressure equals 28 mm Hg, assuming right  atrial pressure equals 10  mm Hg. Incomplete tricuspid  regurgitation Doppler envelopes in this study may  underestimate PASP.

## 2017-06-19 NOTE — CONSULT NOTE ADULT - ASSESSMENT
66 F w/ PMHX cerebral meningioma admitted for multiple episodes of brbpr x 2 days associated with moderate epigastric pain following intake of seafood

## 2017-06-19 NOTE — PROGRESS NOTE ADULT - SUBJECTIVE AND OBJECTIVE BOX
---___---___---___---___---___---___ ---___---___---___---___---___---___---___---___---___---                    <<<  M E D I C A L   A T T E N D I N G    F O L L O W    U P   N O T E  >>>    - Patient seen and examined by me approximately thirty minutes ago.  - In summary, patient is a 66y year old woman who originally presented with bloody diarrhea.  - Patient today overall the same, comfortable, NPO, had another bloody diarrhea just now. pain improved.     ---___---___---___---___---___---      <<<  MEDICATIONS:  >>>    MEDICATIONS  (STANDING):  ciprofloxacin   IVPB 400milliGRAM(s) IV Intermittent every 12 hours  metroNIDAZOLE  IVPB 500milliGRAM(s) IV Intermittent every 8 hours  sodium chloride 0.9%. 1000milliLiter(s) IV Continuous <Continuous>  pantoprazole  Injectable 40milliGRAM(s) IV Push daily    MEDICATIONS  (PRN):  ondansetron Injectable 4milliGRAM(s) IV Push every 6 hours PRN Nausea  morphine  - Injectable 2milliGRAM(s) IV Push every 3 hours PRN Moderate Pain (4 - 6)     ---___---___---___---___---___---     <<<REVIEW OF SYSTEM: >>>    GEN: no fever, no chills, mild abd pain  RESP: no SOB, no cough, no sputum  CVS: no chest pain, no palpitations, no edema  GI: + abdominal pain, no nausea, no vomiting, + diarrhea  : no dysurea, no frequency  NEURO: no headache, no dizziness  PSYCH: no depression, not anxious  Derm : no itching, no rash     ---___---___---___---___---___---          <<<  VITAL SIGNS: >>>    T(C): 36.7, Max: 37 ( @ 11:05)  HR: 100 (81 - 119)  BP: 121/84 (121/84 - 185/95)  RR: 19 (17 - 20)  SpO2: 100% (97% - 100%)     ---___---___---___---___---___---                       PHYSICAL EXAM:    GEN: A&O X 3 , NAD , comfortable  HEENT: NCAT, PERRL, MMM, no scleral icterus, hearing intact  NECK: Supple, No JVD  CVS: S1S2 , regular , No M/R/G appreciated  PULM: CTA B/L,  no W/R/R appreciated  ABD.: soft. non tender, non distended,  bowel sounds present  Extrem: intact pulses , no edema noted  Derm: No rash or ecchymosis noted  PSYCH: normal mood, no depression, not anxious     ---___---___---___---___---___---     <<<  LAB AND IMAGING: >>>       PENDING LABS TODAY                    13.2   17.8  )-----------( 248      ( 2017 11:36 )             39.7             138  |  98  |  19  ----------------------------<  200<H>  3.8   |  20<L>  |  0.89    Ca    8.7      2017 11:36    TPro  7.6  /  Alb  4.1  /  TBili  0.6  /  DBili  x   /  AST  27  /  ALT  15  /  AlkPhos  98      Creatinine:  0.89   ( @ 11:36)  Creatinine:  0.96   ( @ 11:01)              CARDIAC MARKERS ( 2017 11:36 )  x     / <0.01 ng/mL / 109 U/L / x     / 2.4 ng/mL       Urinalysis Basic - ( 2017 11:01 )    Color: Yellow / Appearance: Clear / S.014 / pH: x  Gluc: x / Ketone: Small  / Bili: Negative / Urobili: Negative   Blood: x / Protein: 100 mg/dL / Nitrite: Negative   Leuk Esterase: Negative / RBC: 25-50 /HPF / WBC 3-5 /HPF   Sq Epi: x / Non Sq Epi: OCC /HPF / Bacteria: Few /HPF    STOOL STUDIES PENDING     \Blood Gas Venous - Lactate: 3.1 mmoL/L (17 @ 13:43)     ---___---___---___---___---___---___ ---___---___---___---___---           <<<  A S S E S S M E N T   A N D   P L A N :  >>>    **Problem: Rectal bleed. related to colitis with abdominal pain and diarrhea, leukocytosis     -Monitor H/H closely     - continue cipro / flagyl     -GI consult     -pain mgmt as ordered     -antiemetics as needed.      -f/u stool studies/culture/cdiff, labs     -NPO for now.     ** Problem: Lactic acidemia.  Improved     -repeat lactate level after IVH and monitor    -GI/DVT Prophylaxis.    --------------------------------------------  Case discussed with Pt, Rn,. GI  Education given on plan of care, diet, causes of colitis    >>______________________<<            VÍCTOR Coleman D.O.            Pager: [phone]

## 2017-06-19 NOTE — PATIENT PROFILE ADULT. - VISION (WITH CORRECTIVE LENSES IF THE PATIENT USUALLY WEARS THEM):
Partially impaired: cannot see medication labels or newsprint, but can see obstacles in path, and the surrounding layout; can count fingers at arm's length/reading ,watching TV

## 2017-06-20 LAB
ANION GAP SERPL CALC-SCNC: 14 MMOL/L — SIGNIFICANT CHANGE UP (ref 5–17)
BAKER'S YEAST IGA QN IA: 5.8 UNITS — SIGNIFICANT CHANGE UP
BAKER'S YEAST IGA QN IA: NEGATIVE — SIGNIFICANT CHANGE UP
BAKER'S YEAST IGG QN IA: <5 UNITS — SIGNIFICANT CHANGE UP
BAKER'S YEAST IGG QN IA: NEGATIVE — SIGNIFICANT CHANGE UP
BASOPHILS # BLD AUTO: 0.03 K/UL — SIGNIFICANT CHANGE UP (ref 0–0.2)
BASOPHILS NFR BLD AUTO: 0.2 % — SIGNIFICANT CHANGE UP (ref 0–2)
BUN SERPL-MCNC: 11 MG/DL — SIGNIFICANT CHANGE UP (ref 7–23)
CALCIUM SERPL-MCNC: 9.2 MG/DL — SIGNIFICANT CHANGE UP (ref 8.4–10.5)
CHLORIDE SERPL-SCNC: 100 MMOL/L — SIGNIFICANT CHANGE UP (ref 96–108)
CO2 SERPL-SCNC: 22 MMOL/L — SIGNIFICANT CHANGE UP (ref 22–31)
CREAT SERPL-MCNC: 0.92 MG/DL — SIGNIFICANT CHANGE UP (ref 0.5–1.3)
EOSINOPHIL # BLD AUTO: 0.08 K/UL — SIGNIFICANT CHANGE UP (ref 0–0.5)
EOSINOPHIL NFR BLD AUTO: 0.5 % — SIGNIFICANT CHANGE UP (ref 0–6)
GLUCOSE SERPL-MCNC: 110 MG/DL — HIGH (ref 70–99)
HCT VFR BLD CALC: 39 % — SIGNIFICANT CHANGE UP (ref 34.5–45)
HGB BLD-MCNC: 13.2 G/DL — SIGNIFICANT CHANGE UP (ref 11.5–15.5)
IMM GRANULOCYTES NFR BLD AUTO: 0.2 % — SIGNIFICANT CHANGE UP (ref 0–1.5)
LYMPHOCYTES # BLD AUTO: 15.3 % — SIGNIFICANT CHANGE UP (ref 13–44)
LYMPHOCYTES # BLD AUTO: 2.6 K/UL — SIGNIFICANT CHANGE UP (ref 1–3.3)
MAGNESIUM SERPL-MCNC: 2 MG/DL — SIGNIFICANT CHANGE UP (ref 1.6–2.6)
MCHC RBC-ENTMCNC: 30.8 PG — SIGNIFICANT CHANGE UP (ref 27–34)
MCHC RBC-ENTMCNC: 33.8 GM/DL — SIGNIFICANT CHANGE UP (ref 32–36)
MCV RBC AUTO: 90.9 FL — SIGNIFICANT CHANGE UP (ref 80–100)
MONOCYTES # BLD AUTO: 0.82 K/UL — SIGNIFICANT CHANGE UP (ref 0–0.9)
MONOCYTES NFR BLD AUTO: 4.8 % — SIGNIFICANT CHANGE UP (ref 2–14)
NEUTROPHILS # BLD AUTO: 13.43 K/UL — HIGH (ref 1.8–7.4)
NEUTROPHILS NFR BLD AUTO: 79 % — HIGH (ref 43–77)
PLATELET # BLD AUTO: 250 K/UL — SIGNIFICANT CHANGE UP (ref 150–400)
POTASSIUM SERPL-MCNC: 4.1 MMOL/L — SIGNIFICANT CHANGE UP (ref 3.5–5.3)
POTASSIUM SERPL-SCNC: 4.1 MMOL/L — SIGNIFICANT CHANGE UP (ref 3.5–5.3)
RBC # BLD: 4.29 M/UL — SIGNIFICANT CHANGE UP (ref 3.8–5.2)
RBC # FLD: 13.4 % — SIGNIFICANT CHANGE UP (ref 10.3–14.5)
SODIUM SERPL-SCNC: 136 MMOL/L — SIGNIFICANT CHANGE UP (ref 135–145)
TSH SERPL-MCNC: 1.81 UIU/ML — SIGNIFICANT CHANGE UP (ref 0.27–4.2)
WBC # BLD: 16.99 K/UL — HIGH (ref 3.8–10.5)
WBC # FLD AUTO: 16.99 K/UL — HIGH (ref 3.8–10.5)

## 2017-06-20 RX ADMIN — Medication 100 MILLIGRAM(S): at 21:12

## 2017-06-20 RX ADMIN — SODIUM CHLORIDE 75 MILLILITER(S): 9 INJECTION INTRAMUSCULAR; INTRAVENOUS; SUBCUTANEOUS at 18:22

## 2017-06-20 RX ADMIN — Medication 100 MILLIGRAM(S): at 05:04

## 2017-06-20 RX ADMIN — Medication 30 MILLILITER(S): at 15:14

## 2017-06-20 RX ADMIN — Medication 1 TABLET(S): at 18:17

## 2017-06-20 RX ADMIN — Medication 1 TABLET(S): at 12:11

## 2017-06-20 RX ADMIN — Medication 650 MILLIGRAM(S): at 18:17

## 2017-06-20 RX ADMIN — Medication 100 MILLIGRAM(S): at 15:15

## 2017-06-20 RX ADMIN — Medication 650 MILLIGRAM(S): at 18:50

## 2017-06-20 RX ADMIN — Medication 200 MILLIGRAM(S): at 18:22

## 2017-06-20 RX ADMIN — Medication 200 MILLIGRAM(S): at 05:04

## 2017-06-20 RX ADMIN — PANTOPRAZOLE SODIUM 40 MILLIGRAM(S): 20 TABLET, DELAYED RELEASE ORAL at 12:11

## 2017-06-20 NOTE — CONSULT NOTE ADULT - SUBJECTIVE AND OBJECTIVE BOX
CHIEF COMPLAINT: abdominal pain    HPI: Pt well known to our practice. She is 66 year old woman PMH of nonobstructive CAD, mild myocardial bridge of the LAD, menigioma presenting with one day history of abdominal pain and bloody diarrhea after eating seafood at a restaurant. Pt diagnosed with infectious colitis. She is feeling better, still complains of diffuse abdominal pain, no CP, palpitations, no dyspnea, no syncope.        PAST MEDICAL & SURGICAL HISTORY:  Cerebral Meningioma: s/p resection 2002  Benign cerebral hemangioma: excision  No significant past surgical history  No significant past surgical history          PREVIOUS DIAGNOSTIC TESTING:    [ ] Echocardiogram:  [ ]  Catheterization:  [ ] Stress Test:  	    MEDICATIONS:  MEDICATIONS  (STANDING):  ciprofloxacin   IVPB 400milliGRAM(s) IV Intermittent every 12 hours  metroNIDAZOLE  IVPB 500milliGRAM(s) IV Intermittent every 8 hours  sodium chloride 0.9%. 1000milliLiter(s) IV Continuous <Continuous>  pantoprazole  Injectable 40milliGRAM(s) IV Push daily  lactobacillus acidophilus 1Tablet(s) Oral three times a day with meals      FAMILY HISTORY:  Family history of Alzheimer&#x27;s disease  Family history of heart attack  No pertinent family history in first degree relatives      SOCIAL HISTORY:    [ ] Non-smoker  [ ] Smoker  [ ] Alcohol    Allergies    No Known Allergies    Intolerances    	    REVIEW OF SYSTEMS:  CONSTITUTIONAL: No fever, weight loss, or fatigue  EYES: No eye pain, visual disturbances, or discharge  ENMT:  No difficulty hearing, tinnitus, vertigo; No sinus or throat pain  NECK: No pain or stiffness  RESPIRATORY: No cough, wheezing, chills or hemoptysis; No Shortness of Breath  CARDIOVASCULAR: No chest pain, palpitations, passing out, dizziness, or leg swelling  GASTROINTESTINAL:  see above.  GENITOURINARY: No dysuria, frequency, hematuria, or incontinence  NEUROLOGICAL: No headaches, memory loss, loss of strength, numbness, or tremors  SKIN: No itching, burning, rashes, or lesions   	    [ ] All others negative	  [ ] Unable to obtain    PHYSICAL EXAM:  T(C): 37.4, Max: 37.4 (06-20 @ 04:53)  HR: 80 (70 - 93)  BP: 101/67 (93/58 - 135/75)  RR: 17 (17 - 18)  SpO2: 99% (96% - 100%)  Wt(kg): --  I&O's Summary    I & Os for current day (as of 20 Jun 2017 11:14)  =============================================  IN: 1000 ml / OUT: 0 ml / NET: 1000 ml      Appearance: Normal	  Psychiatry: A & O x 3, Mood & affect appropriate  HEENT:   Normal oral mucosa, PERRL, EOMI	  Lymphatic: No lymphadenopathy  Cardiovascular: Normal S1 S2,RRR, No JVD, No murmurs  Respiratory: Lungs clear to auscultation	  Gastrointestinal:  mild diff tenderness  Skin: No rashes, No ecchymoses, No cyanosis	  Neurologic: Non-focal  Extremities: Normal range of motion, No clubbing, cyanosis or edema  Vascular: Peripheral pulses palpable 2+ bilaterally    TELEMETRY: 	    ECG:  	NSR, prolonged  msec  RADIOLOGY:  OTHER: 	  	  LABS:	 	    CARDIAC MARKERS:                                  13.2   16.99 )-----------( 250      ( 20 Jun 2017 07:13 )             39.0     06-20    136  |  100  |  11  ----------------------------<  110<H>  4.1   |  22  |  0.92    Ca    9.2      20 Jun 2017 07:26  Mg     2.0     06-20    TPro  7.6  /  Alb  4.1  /  TBili  0.6  /  DBili  x   /  AST  27  /  ALT  15  /  AlkPhos  98  06-18      proBNP:   Lipid Profile:   HgA1c:   TSH: Thyroid Stimulating Hormone, Serum: 1.81 uIU/mL (06-20 @ 07:26)      ASSESSMENT/PLAN:

## 2017-06-20 NOTE — PROGRESS NOTE ADULT - SUBJECTIVE AND OBJECTIVE BOX
---___---___---___---___---___---___ ---___---___---___---___---___---___---___---___---___---                    <<<  M E D I C A L   A T T E N D I N G    F O L L O W    U P   N O T E  >>>    - Patient seen and examined by me approximately thirty minutes ago.  - In summary, patient is a 66y year old woman who originally presented with bloody diarrhea.  - Patient today overall slightly improved with abd pain, on full liquid diet but has no appetite. had another bloody diarrhea just earlier this morning.      ---___---___---___---___---___---      <<<  MEDICATIONS:  >>>    ciprofloxacin   IVPB 400milliGRAM(s) IV Intermittent every 12 hours  metroNIDAZOLE  IVPB 500milliGRAM(s) IV Intermittent every 8 hours  sodium chloride 0.9%. 1000milliLiter(s) IV Continuous <Continuous>  pantoprazole  Injectable 40milliGRAM(s) IV Push daily  lactobacillus acidophilus 1Tablet(s) Oral three times a day with meals    MEDICATIONS  (PRN):  ondansetron Injectable 4milliGRAM(s) IV Push every 6 hours PRN Nausea  morphine  - Injectable 2milliGRAM(s) IV Push every 3 hours PRN Moderate Pain (4 - 6)  acetaminophen   Tablet. 650milliGRAM(s) Oral every 6 hours PRN Moderate Pain (4 - 6)     ---___---___---___---___---___---     <<<REVIEW OF SYSTEM: >>>    GEN: no fever, no chills, mild abd pain  RESP: no SOB, no cough, no sputum  CVS: no chest pain, no palpitations, no edema  GI: + abdominal pain, no nausea, no vomiting, + diarrhea  : no dysurea, no frequency  NEURO: no headache, no dizziness  PSYCH: no depression, not anxious  Derm : no itching, no rash     ---___---___---___---___---___---          <<<  VITAL SIGNS: >>>    T(C): 37.4, Max: 37.4 ( @ 04:53)  HR: 80 (70 - 93)  BP: 101/67 (93/58 - 135/75)  RR: 17 (17 - 18)  SpO2: 99% (96% - 100%)    I&O's Summary    I & Os for current day (as of 2017 10:37)  =============================================  IN: 1000 ml / OUT: 0 ml / NET: 1000 ml     ---___---___---___---___---___---                       PHYSICAL EXAM:    GEN: A&O X 3 , NAD , comfortable  HEENT: NCAT, PERRL, MMM, no scleral icterus, hearing intact  NECK: Supple, No JVD  CVS: S1S2 , regular , No M/R/G appreciated  PULM: CTA B/L,  no W/R/R appreciated  ABD.: soft. non tender, non distended,  bowel sounds present  Extrem: intact pulses , no edema noted  Derm: No rash or ecchymosis noted  PSYCH: normal mood, no depression, not anxious     ---___---___---___---___---___---     <<<  LAB AND IMAGING: >>>                        13.2   16.99 )-----------( 250      ( 2017 07:13 )             39.0     WBC:  16.99    (17 @ 07:13)  WBC:  19.3    (17 @ 16:27)  WBC:  17.8    (17 @ 11:36)     Hemoglobin:   13.2  ( @ 07:13)   Hemoglobin:   12.7  ( @ 16:27)   Hemoglobin:   13.2  ( @ 11:36)         138  |  101  |  13  ----------------------------<  120<H>  3.5   |  26  |  1.17    Ca    8.9      2017 16:27    TPro  7.6  /  Alb  4.1  /  TBili  0.6  /  DBili  x   /  AST  27  /  ALT  15  /  AlkPhos  98      Creatinine:  1.17   ( @ 16:27)  Creatinine:  0.89   ( @ 11:36)  Creatinine:  0.96   ( @ 11:01)              CARDIAC MARKERS ( 2017 11:36 )  x     / <0.01 ng/mL / 109 U/L / x     / 2.4 ng/mL       Urinalysis Basic - ( 2017 11:01 )    Color: Yellow / Appearance: Clear / S.014 / pH: x  Gluc: x / Ketone: Small  / Bili: Negative / Urobili: Negative   Blood: x / Protein: 100 mg/dL / Nitrite: Negative   Leuk Esterase: Negative / RBC: 25-50 /HPF / WBC 3-5 /HPF   Sq Epi: x / Non Sq Epi: OCC /HPF / Bacteria: Few /HPF    TSH:      1.81   (17)           C U L T U R E S :   @ 12:34  spsmn. source .Stool Feces  culture results   Testing in progress     @ 12:33  spsmn. source .Stool Feces  culture results   No enteric pathogens to date: Final culture pending     @ 14:25  spsmn. source .Urine Clean Catch (Midstream)  culture results   No growth        ---___---___---___---___---___---___ ---___---___---___---___---           <<<  A S S E S S M E N T   A N D   P L A N :  >>>    **Problem: Rectal bleed. related to colitis with abdominal pain and diarrhea, leukocytosis     -Monitor H/H closely     - continue cipro / flagyl     -GI and Sx appreciated     -pain mgmt as ordered     -antiemetics as needed      -f/u stool studies     -diet as tolerating    ** Problem: Lactic acidemia.  Improved     -repeat lactate level after IVH and monitor    -GI/DVT Prophylaxis.    --------------------------------------------  Case discussed with Pt, Rn,. GI  Education given on plan of care, diet, causes of colitis    >>______________________<<            VÍCTOR Coleman D.O.            Pager: [phone]

## 2017-06-20 NOTE — PROGRESS NOTE ADULT - SUBJECTIVE AND OBJECTIVE BOX
INTERVAL HPI/OVERNIGHT EVENTS:  Pt S/E -- w/ mild abdominal discomfort  No N/V -- tolerating full liquids well  BM x 1 last evening / BM x 1 this AM w/ BRBPR -- slightly decreased compared to prior BMs      MEDICATIONS  (STANDING):  ciprofloxacin   IVPB 400milliGRAM(s) IV Intermittent every 12 hours  metroNIDAZOLE  IVPB 500milliGRAM(s) IV Intermittent every 8 hours  sodium chloride 0.9%. 1000milliLiter(s) IV Continuous <Continuous>  pantoprazole  Injectable 40milliGRAM(s) IV Push daily  lactobacillus acidophilus 1Tablet(s) Oral three times a day with meals    MEDICATIONS  (PRN):  ondansetron Injectable 4milliGRAM(s) IV Push every 6 hours PRN Nausea  morphine  - Injectable 2milliGRAM(s) IV Push every 3 hours PRN Moderate Pain (4 - 6)  acetaminophen   Tablet. 650milliGRAM(s) Oral every 6 hours PRN Moderate Pain (4 - 6)      Allergies    No Known Allergies      ROS:   General:  No wt loss, fevers, chills, night sweats, fatigue  Eyes:  Good vision, no reported pain  ENT:  No sore throat, pain, runny nose, dysphagia  CV:  No pain, palpitations, hypo/hypertension  Resp:  No dyspnea, cough, tachypnea, wheezing  GI:  Mild pain, No nausea, No vomiting, No diarrhea, No constipation, No weight loss, No fever, No pruritis, No tarry stools, No dysphagia,  :  No pain, bleeding, incontinence, nocturia  Muscle:  No pain, weakness  Neuro:  No weakness, tingling, memory problems  Psych:  No fatigue, insomnia, mood problems, depression  Endocrine:  No polyuria, polydipsia, cold/heat intolerance  Heme:  No petechiae, ecchymosis, easy bruisability  Skin:  No rash, tattoos, scars, edema      PHYSICAL EXAM:   Vital Signs:  Vital Signs Last 24 Hrs  T(C): 37.4, Max: 37.4 (- @ 04:53)  T(F): 99.3, Max: 99.3 (- @ 04:53)  HR: 80 (70 - 93)  BP: 101/67 (93/58 - 135/75)  BP(mean): --  RR: 17 (17 - 18)  SpO2: 99% (96% - 100%)  Daily Height in cm: 157.48 (2017 18:10)    Daily     GENERAL:  Appears stated age, well-groomed, well-nourished, no distress  HEENT:  NC/AT,  conjunctivae clear and pink, no thyromegaly, nodules, adenopathy, no JVD, sclera -anicteric  CHEST:  Full & symmetric excursion, no increased effort, breath sounds clear  HEART:  Regular rhythm, S1, S2, no murmur/rub/S3/S4, no abdominal bruit, no edema  ABDOMEN:  Soft, non-tender, non-distended, normoactive bowel sounds,  no masses ,no hepato-splenomegaly, no signs of chronic liver disease  EXTEREMITIES:  no cyanosis,clubbing or edema  SKIN:  No rash/erythema/ecchymoses/petechiae/wounds/abscess/warm/dry  NEURO:  Alert, oriented, no asterixis, no tremor, no encephalopathy      LABS:                        13.2   16.99 )-----------( 250      ( 2017 07:13 )             39.0     06-19    138  |  101  |  13  ----------------------------<  120<H>  3.5   |  26  |  1.17    Ca    8.9      2017 16:27    TPro  7.6  /  Alb  4.1  /  TBili  0.6  /  DBili  x   /  AST  27  /  ALT  15  /  AlkPhos  98  06-18      Urinalysis Basic - ( 2017 11:01 )    Color: Yellow / Appearance: Clear / S.014 / pH: x  Gluc: x / Ketone: Small  / Bili: Negative / Urobili: Negative   Blood: x / Protein: 100 mg/dL / Nitrite: Negative   Leuk Esterase: Negative / RBC: 25-50 /HPF / WBC 3-5 /HPF   Sq Epi: x / Non Sq Epi: OCC /HPF / Bacteria: Few /HPF        RADIOLOGY & ADDITIONAL TESTS:    Stool studies / ova and parasites pending

## 2017-06-20 NOTE — DISCHARGE NOTE ADULT - VISION (WITH CORRECTIVE LENSES IF THE PATIENT USUALLY WEARS THEM):
reading ,watching TV/Partially impaired: cannot see medication labels or newsprint, but can see obstacles in path, and the surrounding layout; can count fingers at arm's length

## 2017-06-20 NOTE — PROGRESS NOTE ADULT - ASSESSMENT
ASSESSMENT  66y female with colitis, likely infectious    PLAN  - Patient improving with current management  - no surgical intervention indicated  - GI recs noted and appreciated.   - f/u stool cultures, o&p, etc. with next bowel movement.  - continue chemical VTE ppx  - Please call with further surgical questions.      Osorio Townsend MD PGY2  Acute Care Surgery, #[id number]

## 2017-06-20 NOTE — DISCHARGE NOTE ADULT - HOSPITAL COURSE
to be completed by attending 66 y.o. woman with no significant PMH, presents with severe ab pain, nausea/vomiting, and bloody BM; started about two days  ago.   Pt had dinner with family (sea food and steak) and went home, started with BRBPR X1 with normal BM, which turned to diarrhea with nausea sn vomiting (still nauseous), and worsening abdominal pain.  Pt denies priors. Did not take anything for pain. Pain is diffuse, non radiating, sharp.  In ED found to have pan colitis on CT    [place] of hospital course:  Patient was seen and evaluated and followed by multiple specialists throughout the stay and treated medically with improvement.  Patient was otherwise stable and had no other complications.  Patient was discharged to home in stable condition to follow up with primary doctor as outpatient for follow up and further care.

## 2017-06-20 NOTE — DISCHARGE NOTE ADULT - MEDICATION SUMMARY - MEDICATIONS TO STOP TAKING
I will STOP taking the medications listed below when I get home from the hospital:    aspirin 81 mg oral tablet, chewable  -- 1 tab(s) by mouth once a day    Advil 200 mg oral tablet  -- 1 tab(s) by mouth every 6 hours, As Needed

## 2017-06-20 NOTE — PROGRESS NOTE ADULT - SUBJECTIVE AND OBJECTIVE BOX
ACUTE CARE SURGERY ([facility] - #[id number]) PROGRESS NOTE  ---------------------------------------------------------------------------------------------   CC: Colitis    INTERVAL EVENTS: Patient has been on IV antibiotics, with improvement of symptoms.  Abdominal pain has decreased, patient has not had any BM for the past 24 hours, denies nausea or emesis, denies constipation / diarrhea.  no blood per rectum.  Patient has been tolerating liquid diet.       VITALS  T(C): 37.4, Max: 37.4 (06-20 @ 04:53)  HR: 80 (70 - 93)  BP: 101/67 (93/58 - 135/75)  BP(mean): --  RR: 17 (17 - 18)  SpO2: 99% (96% - 100%)    PHYSICAL EXAM:   General: NAD, Lying in bed comfortably  Neuro: alert, oriented x3  GI/Abd: Soft, ND, , mild b/l lower quadrant tenderness, no rebound/guarding, no masses palpated  Vascular: All 4 extremities warm.    MEDICATIONS (STANDING): ciprofloxacin   IVPB 400milliGRAM(s) IV Intermittent every 12 hours  metroNIDAZOLE  IVPB 500milliGRAM(s) IV Intermittent every 8 hours  sodium chloride 0.9%. 1000milliLiter(s) IV Continuous <Continuous>  pantoprazole  Injectable 40milliGRAM(s) IV Push daily  lactobacillus acidophilus 1Tablet(s) Oral three times a day with meals    MEDICATIONS (PRN):ondansetron Injectable 4milliGRAM(s) IV Push every 6 hours PRN Nausea  morphine  - Injectable 2milliGRAM(s) IV Push every 3 hours PRN Moderate Pain (4 - 6)  acetaminophen   Tablet. 650milliGRAM(s) Oral every 6 hours PRN Moderate Pain (4 - 6)    LABS  CBC (06-20 @ 07:13)                              13.2                           16.99<H>  )----------------(  250        79.0<H>% Neutrophils, 15.3  % Lymphocytes, ANC: 13.43<H>                              39.0    CBC (06-19 @ 16:27)                              12.7                           19.3<H>  )----------------(  211        81.3<H>% Neutrophils, 11.0<L>% Lymphocytes, ANC: 15.7<H>                              36.7      BMP (06-19 @ 16:27)             138     |  101     |  13    		Ca++ --      Ca 8.9                ---------------------------------( 120<H>		Mg --                 3.5     |  26      |  1.17  			Ph --

## 2017-06-20 NOTE — DISCHARGE NOTE ADULT - CARE PLAN
Principal Discharge DX:	Colitis  Goal:	resolution of symptoms  Instructions for follow-up, activity and diet:	HOME CARE INSTRUCTIONS  Get plenty of rest.  Drink enough water and fluids to keep your urine clear or pale yellow.  Eat a well-balanced diet.  Call your caregiver for follow-up as recommended.  SEEK IMMEDIATE MEDICAL CARE IF:  You develop chills.  You have an oral temperature above _____________________, not controlled by medicine.  You have extreme weakness, fainting, or dehydration.  You have repeated vomiting.  You develop severe belly (abdominal) pain or are passing bloody or tarry stools  Secondary Diagnosis:	Generalized abdominal pain  Instructions for follow-up, activity and diet:	resolved

## 2017-06-20 NOTE — DISCHARGE NOTE ADULT - MEDICATION SUMMARY - MEDICATIONS TO TAKE
I will START or STAY ON the medications listed below when I get home from the hospital:    Flagyl 500 mg oral tablet  -- 1 tab(s) by mouth every 8 hours  -- Do not drink alcoholic beverages when taking this medication.  Finish all this medication unless otherwise directed by prescriber.  May discolor urine or feces.    -- Indication: For Colitis    Systane ophthalmic solution  -- 1 drop(s) to each affected eye once a day  -- Indication: For eye drops    lactobacillus acidophilus oral capsule  -- 1 tab(s) by mouth 3 times a day  -- Indication: For supplement    Cipro 500 mg oral tablet  -- 1 tab(s) by mouth every 12 hours  -- Avoid prolonged or excessive exposure to direct and/or artificial sunlight while taking this medication.  Check with your doctor before becoming pregnant.  Do not take dairy products, antacids, or iron preparations within one hour of this medication.  Finish all this medication unless otherwise directed by prescriber.  Medication should be taken with plenty of water.    -- Indication: For Colitis    Cod Liver oral oil  -- 5 milliliter(s) by mouth once a day  -- Indication: For SUPPLEMENT    Centrum Women's  -- 1 tab(s) by mouth once a day  -- Indication: For supplement

## 2017-06-20 NOTE — PROGRESS NOTE ADULT - PROBLEM SELECTOR PLAN 1
- Continue cipro IVPB q 12 H / flagyl IVPB q 8 H  - Pain control prn  - Bacid 1 tab po tid / PPI 40 mg IV qd  - F/U ASCA / ANCA / FOBT / Stool O&P (prelim stool cultures negative) / C DIFF   - Full liquid diet, will consider advancing to soft this afternoon

## 2017-06-20 NOTE — DISCHARGE NOTE ADULT - PLAN OF CARE
resolution of symptoms HOME CARE INSTRUCTIONS  Get plenty of rest.  Drink enough water and fluids to keep your urine clear or pale yellow.  Eat a well-balanced diet.  Call your caregiver for follow-up as recommended.  SEEK IMMEDIATE MEDICAL CARE IF:  You develop chills.  You have an oral temperature above _____________________, not controlled by medicine.  You have extreme weakness, fainting, or dehydration.  You have repeated vomiting.  You develop severe belly (abdominal) pain or are passing bloody or tarry stools resolved

## 2017-06-20 NOTE — CONSULT NOTE ADULT - ASSESSMENT
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

## 2017-06-20 NOTE — DISCHARGE NOTE ADULT - PATIENT PORTAL LINK FT
“You can access the Follow[facility] Patient Portal, offered by [facility], by registering with the following website: [email or web address]”

## 2017-06-20 NOTE — DISCHARGE NOTE ADULT - CONDITIONS AT DISCHARGE
pt discharge home today, patient agreable to plan, discharge instructions given to patient - understanding verbalized, IVL removed - site remained asymptomatic, skin intact, all belongings accounted for and with patient awaiting

## 2017-06-21 VITALS
RESPIRATION RATE: 17 BRPM | SYSTOLIC BLOOD PRESSURE: 96 MMHG | DIASTOLIC BLOOD PRESSURE: 61 MMHG | HEART RATE: 75 BPM | OXYGEN SATURATION: 99 % | TEMPERATURE: 99 F

## 2017-06-21 LAB
AUTO DIFF PNL BLD: NEGATIVE — SIGNIFICANT CHANGE UP
BASOPHILS # BLD AUTO: 0.02 K/UL — SIGNIFICANT CHANGE UP (ref 0–0.2)
BASOPHILS NFR BLD AUTO: 0.2 % — SIGNIFICANT CHANGE UP (ref 0–2)
C-ANCA SER-ACNC: NEGATIVE — SIGNIFICANT CHANGE UP
CULTURE RESULTS: SIGNIFICANT CHANGE UP
EOSINOPHIL # BLD AUTO: 0.18 K/UL — SIGNIFICANT CHANGE UP (ref 0–0.5)
EOSINOPHIL NFR BLD AUTO: 1.4 % — SIGNIFICANT CHANGE UP (ref 0–6)
HCT VFR BLD CALC: 33 % — LOW (ref 34.5–45)
HGB BLD-MCNC: 10.9 G/DL — LOW (ref 11.5–15.5)
IMM GRANULOCYTES NFR BLD AUTO: 0.2 % — SIGNIFICANT CHANGE UP (ref 0–1.5)
LACTATE SERPL-SCNC: 0.6 MMOL/L — LOW (ref 0.7–2)
LYMPHOCYTES # BLD AUTO: 16.3 % — SIGNIFICANT CHANGE UP (ref 13–44)
LYMPHOCYTES # BLD AUTO: 2.05 K/UL — SIGNIFICANT CHANGE UP (ref 1–3.3)
MCHC RBC-ENTMCNC: 30 PG — SIGNIFICANT CHANGE UP (ref 27–34)
MCHC RBC-ENTMCNC: 33 GM/DL — SIGNIFICANT CHANGE UP (ref 32–36)
MCV RBC AUTO: 90.9 FL — SIGNIFICANT CHANGE UP (ref 80–100)
MONOCYTES # BLD AUTO: 0.81 K/UL — SIGNIFICANT CHANGE UP (ref 0–0.9)
MONOCYTES NFR BLD AUTO: 6.4 % — SIGNIFICANT CHANGE UP (ref 2–14)
NEUTROPHILS # BLD AUTO: 9.49 K/UL — HIGH (ref 1.8–7.4)
NEUTROPHILS NFR BLD AUTO: 75.5 % — SIGNIFICANT CHANGE UP (ref 43–77)
P-ANCA SER-ACNC: NEGATIVE — SIGNIFICANT CHANGE UP
PLATELET # BLD AUTO: 220 K/UL — SIGNIFICANT CHANGE UP (ref 150–400)
RBC # BLD: 3.63 M/UL — LOW (ref 3.8–5.2)
RBC # FLD: 13 % — SIGNIFICANT CHANGE UP (ref 10.3–14.5)
SPECIMEN SOURCE: SIGNIFICANT CHANGE UP
WBC # BLD: 12.57 K/UL — HIGH (ref 3.8–10.5)
WBC # FLD AUTO: 12.57 K/UL — HIGH (ref 3.8–10.5)

## 2017-06-21 PROCEDURE — 86850 RBC ANTIBODY SCREEN: CPT

## 2017-06-21 PROCEDURE — 87449 NOS EACH ORGANISM AG IA: CPT

## 2017-06-21 PROCEDURE — 71045 X-RAY EXAM CHEST 1 VIEW: CPT

## 2017-06-21 PROCEDURE — 87045 FECES CULTURE AEROBIC BACT: CPT

## 2017-06-21 PROCEDURE — 86036 ANCA SCREEN EACH ANTIBODY: CPT

## 2017-06-21 PROCEDURE — 81001 URINALYSIS AUTO W/SCOPE: CPT

## 2017-06-21 PROCEDURE — 85014 HEMATOCRIT: CPT

## 2017-06-21 PROCEDURE — 96375 TX/PRO/DX INJ NEW DRUG ADDON: CPT | Mod: XU

## 2017-06-21 PROCEDURE — 96374 THER/PROPH/DIAG INJ IV PUSH: CPT | Mod: XU

## 2017-06-21 PROCEDURE — 87086 URINE CULTURE/COLONY COUNT: CPT

## 2017-06-21 PROCEDURE — 93005 ELECTROCARDIOGRAM TRACING: CPT

## 2017-06-21 PROCEDURE — 85027 COMPLETE CBC AUTOMATED: CPT

## 2017-06-21 PROCEDURE — 87046 STOOL CULTR AEROBIC BACT EA: CPT

## 2017-06-21 PROCEDURE — 86901 BLOOD TYPING SEROLOGIC RH(D): CPT

## 2017-06-21 PROCEDURE — 82435 ASSAY OF BLOOD CHLORIDE: CPT

## 2017-06-21 PROCEDURE — 82330 ASSAY OF CALCIUM: CPT

## 2017-06-21 PROCEDURE — 84484 ASSAY OF TROPONIN QUANT: CPT

## 2017-06-21 PROCEDURE — 87177 OVA AND PARASITES SMEARS: CPT

## 2017-06-21 PROCEDURE — 84443 ASSAY THYROID STIM HORMONE: CPT

## 2017-06-21 PROCEDURE — 82947 ASSAY GLUCOSE BLOOD QUANT: CPT

## 2017-06-21 PROCEDURE — 84132 ASSAY OF SERUM POTASSIUM: CPT

## 2017-06-21 PROCEDURE — 82803 BLOOD GASES ANY COMBINATION: CPT

## 2017-06-21 PROCEDURE — 74174 CTA ABD&PLVS W/CONTRAST: CPT

## 2017-06-21 PROCEDURE — 71275 CT ANGIOGRAPHY CHEST: CPT

## 2017-06-21 PROCEDURE — 99285 EMERGENCY DEPT VISIT HI MDM: CPT | Mod: 25

## 2017-06-21 PROCEDURE — 96376 TX/PRO/DX INJ SAME DRUG ADON: CPT

## 2017-06-21 PROCEDURE — 83520 IMMUNOASSAY QUANT NOS NONAB: CPT

## 2017-06-21 PROCEDURE — 86900 BLOOD TYPING SEROLOGIC ABO: CPT

## 2017-06-21 PROCEDURE — 82550 ASSAY OF CK (CPK): CPT

## 2017-06-21 PROCEDURE — 80053 COMPREHEN METABOLIC PANEL: CPT

## 2017-06-21 PROCEDURE — 80048 BASIC METABOLIC PNL TOTAL CA: CPT

## 2017-06-21 PROCEDURE — 83605 ASSAY OF LACTIC ACID: CPT

## 2017-06-21 PROCEDURE — 83690 ASSAY OF LIPASE: CPT

## 2017-06-21 PROCEDURE — 84295 ASSAY OF SERUM SODIUM: CPT

## 2017-06-21 PROCEDURE — 87324 CLOSTRIDIUM AG IA: CPT

## 2017-06-21 PROCEDURE — 83735 ASSAY OF MAGNESIUM: CPT

## 2017-06-21 PROCEDURE — 82553 CREATINE MB FRACTION: CPT

## 2017-06-21 RX ORDER — IBUPROFEN 200 MG
1 TABLET ORAL
Qty: 0 | Refills: 0 | COMMUNITY

## 2017-06-21 RX ORDER — ASPIRIN/CALCIUM CARB/MAGNESIUM 324 MG
1 TABLET ORAL
Qty: 0 | Refills: 0 | COMMUNITY

## 2017-06-21 RX ORDER — LACTOBACILLUS ACIDOPHILUS 100MM CELL
1 CAPSULE ORAL
Qty: 0 | Refills: 0 | COMMUNITY
Start: 2017-06-21

## 2017-06-21 RX ORDER — MOXIFLOXACIN HYDROCHLORIDE TABLETS, 400 MG 400 MG/1
1 TABLET, FILM COATED ORAL
Qty: 20 | Refills: 0 | OUTPATIENT
Start: 2017-06-21 | End: 2017-07-01

## 2017-06-21 RX ORDER — METRONIDAZOLE 500 MG
1 TABLET ORAL
Qty: 30 | Refills: 0 | OUTPATIENT
Start: 2017-06-21 | End: 2017-07-01

## 2017-06-21 RX ADMIN — Medication 1 TABLET(S): at 08:22

## 2017-06-21 RX ADMIN — Medication 200 MILLIGRAM(S): at 05:33

## 2017-06-21 RX ADMIN — Medication 1 TABLET(S): at 11:48

## 2017-06-21 RX ADMIN — Medication 100 MILLIGRAM(S): at 16:25

## 2017-06-21 RX ADMIN — PANTOPRAZOLE SODIUM 40 MILLIGRAM(S): 20 TABLET, DELAYED RELEASE ORAL at 11:48

## 2017-06-21 RX ADMIN — Medication 100 MILLIGRAM(S): at 05:34

## 2017-06-21 RX ADMIN — SODIUM CHLORIDE 75 MILLILITER(S): 9 INJECTION INTRAMUSCULAR; INTRAVENOUS; SUBCUTANEOUS at 08:22

## 2017-06-21 NOTE — PROGRESS NOTE ADULT - ATTENDING COMMENTS
Patient seen and examined, agree with the above assessment and plan by MEGA Pascual.  CV status remains stable  Pt clinically improved  Monitor H/H  Hold ASA and BB for now  IV abx  DC planning
outpatient follow up with us for colonoscopy  dc planning

## 2017-06-21 NOTE — PROGRESS NOTE ADULT - SUBJECTIVE AND OBJECTIVE BOX
CC denies of chest pain or sob       PHYSICAL EXAM:  T(C): 37.1, Max: 37.1 (06-21 @ 03:42)  HR: 81 (81 - 100)  BP: 94/55 (94/55 - 118/75)  RR: 16 (16 - 18)  SpO2: 98% (98% - 98%)  Wt(kg): --  I&O's Summary    I & Os for current day (as of 21 Jun 2017 09:43)  =============================================  IN: 1600 ml / OUT: 0 ml / NET: 1600 ml      Appearance: Normal	  Cardiovascular: Normal S1 S2,RRR, No JVD, No murmurs  Respiratory: Lungs clear to auscultation	  Gastrointestinal:  Soft, mild tenderness on palpation , + BS	  Extremities: Normal range of motion, No clubbing, cyanosis or edema        MEDICATIONS  (STANDING):  ciprofloxacin   IVPB 400milliGRAM(s) IV Intermittent every 12 hours  metroNIDAZOLE  IVPB 500milliGRAM(s) IV Intermittent every 8 hours  pantoprazole  Injectable 40milliGRAM(s) IV Push daily  lactobacillus acidophilus 1Tablet(s) Oral three times a day with meals          LABS:	 	                          10.9   12.57 )-----------( 220      ( 21 Jun 2017 07:14 )             33.0     06-20    136  |  100  |  11  ----------------------------<  110<H>  4.1   |  22  |  0.92    Ca    9.2      20 Jun 2017 07:26  Mg     2.0     06-20

## 2017-06-21 NOTE — PROGRESS NOTE ADULT - PROBLEM SELECTOR PLAN 1
- Continue cipro IVPB q 12 H / flagyl IVPB q 8 H  - Pain control prn  - Bacid 1 tab po tid / PPI 40 mg IV qd  - F/U ANCA / FOBT / Stool O&P   - Soft diet as tolerated - Continue cipro IVPB q 12 H / flagyl IVPB q 8 H  - Pain control prn  - Bacid 1 tab po tid / PPI 40 mg IV qd  - F/U ANCA / FOBT / Stool O&P   - Soft diet as tolerated  - Plan for scope as outpatient

## 2017-06-21 NOTE — PROGRESS NOTE ADULT - SUBJECTIVE AND OBJECTIVE BOX
INTERVAL HPI/OVERNIGHT EVENTS:  Pt S/E -- abdominal discomfort improved overall   No N/V -- tolerating soft diet   BM x 4 yesterday / No Bm this AM -- BRBPR decreased compared to prior BMs      MEDICATIONS  (STANDING):  ciprofloxacin   IVPB 400milliGRAM(s) IV Intermittent every 12 hours  metroNIDAZOLE  IVPB 500milliGRAM(s) IV Intermittent every 8 hours  sodium chloride 0.9%. 1000milliLiter(s) IV Continuous <Continuous>  pantoprazole  Injectable 40milliGRAM(s) IV Push daily  lactobacillus acidophilus 1Tablet(s) Oral three times a day with meals    MEDICATIONS  (PRN):  ondansetron Injectable 4milliGRAM(s) IV Push every 6 hours PRN Nausea  morphine  - Injectable 2milliGRAM(s) IV Push every 3 hours PRN Moderate Pain (4 - 6)  acetaminophen   Tablet. 650milliGRAM(s) Oral every 6 hours PRN Moderate Pain (4 - 6)      Allergies    No Known Allergies      ROS:   General:  No wt loss, fevers, chills, night sweats  Eyes:  Good vision, no reported pain  ENT:  No sore throat, pain, runny nose, dysphagia  CV:  No pain, palpitations, hypo/hypertension  Resp:  No dyspnea, cough, tachypnea, wheezing  GI:  No pain, No nausea, No vomiting, No diarrhea, No constipation, No weight loss, No fever, No pruritis, No tarry stools, No dysphagia,  :  No pain, bleeding, incontinence, nocturia  Muscle:  No pain, + Mild weakness  Neuro:  No weakness, tingling, memory problems  Psych:  No fatigue, insomnia, mood problems, depression  Endocrine:  No polyuria, polydipsia, cold/heat intolerance  Heme:  No petechiae, ecchymosis, easy bruisability  Skin:  No rash, tattoos, scars, edema      PHYSICAL EXAM:   Vital Signs:   Vital Signs Last 24 Hrs  T(C): 37.1, Max: 37.1 (06-21 @ 03:42)  T(F): 98.8, Max: 98.8 (06-21 @ 03:42)  HR: 81 (81 - 100)  BP: 94/55 (94/55 - 118/75)  BP(mean): --  RR: 16 (16 - 18)  SpO2: 98% (98% - 98%)  Daily     Daily       GENERAL:  Resting in bed comfortably, in NAD   HEENT:  NC/AT,  conjunctivae clear and pink, no thyromegaly, nodules, adenopathy, no JVD, sclera -anicteric  CHEST:  Full & symmetric excursion, no increased effort, breath sounds clear  HEART:  Regular rhythm, S1, S2, no murmur/rub/S3/S4, no abdominal bruit, no edema  ABDOMEN:  Soft, non-tender, non-distended, normoactive bowel sounds,  no masses ,no hepato-splenomegaly, no signs of chronic liver disease  EXTEREMITIES:  no cyanosis,clubbing or edema  SKIN:  No rash/erythema/ecchymoses/petechiae/wounds/abscess/warm/dry  NEURO:  Alert, oriented, no asterixis, no tremor, no encephalopathy      LABS:                        10.9   12.57 )-----------( 220      ( 21 Jun 2017 07:14 )             33.0     06-20    136  |  100  |  11  ----------------------------<  110<H>  4.1   |  22  |  0.92    Ca    9.2      20 Jun 2017 07:26  Mg     2.0     06-20          RADIOLOGY & ADDITIONAL TESTS:    Stool ova and parasites pending     C DIFF negative     Culture - Stool (06.19.17 @ 12:33)    Specimen Source: .Stool Feces    Culture Results:   No enteric pathogens to date: Final culture pending    ASCA IgA/IgG Antibodies (06.20.17 @ 07:26)    ASCA IgA Antibody Result: 5.8 Units    ASCA IgA Interpretation: Negative    ASCA IgG Antibody Result: <5.0 Units    ASCA IgG Interpretation: Negative: Method: CHRYSTAL   Reference Range: ASCA IGA or ASCA IGG                                   Unit   Negative             <= 20.0   Equivocal          20.1-24.9   Weak Positive    25.0-34.9             Positive              >= 35.0          Clinical GroupNegative:    ASCA IgA Pos   ASCA IgG Pos   Crohn's Disease        49%                     74.4%   Ulcerative Colitis     1.9%                     14.2%             Healthy Controls      1.4%                       1.4%   Clinical Group   ASCA IgA or    ASCA IgANegative: And                                     IgG Pos            IgG Pos   Crohn's Disease       76.2%                47.6%   Ulcerative Colitis     14.6%                  1.3%   Healthy Controls       5.6%                   0.0%

## 2017-06-22 LAB
CULTURE RESULTS: SIGNIFICANT CHANGE UP
SPECIMEN SOURCE: SIGNIFICANT CHANGE UP

## 2018-02-11 ENCOUNTER — EMERGENCY (EMERGENCY)
Facility: HOSPITAL | Age: 68
LOS: 1 days | End: 2018-02-11
Attending: EMERGENCY MEDICINE | Admitting: EMERGENCY MEDICINE
Payer: MEDICARE

## 2018-02-11 VITALS
SYSTOLIC BLOOD PRESSURE: 127 MMHG | RESPIRATION RATE: 17 BRPM | HEART RATE: 76 BPM | OXYGEN SATURATION: 99 % | DIASTOLIC BLOOD PRESSURE: 82 MMHG | TEMPERATURE: 99 F

## 2018-02-11 DIAGNOSIS — D18.02 HEMANGIOMA OF INTRACRANIAL STRUCTURES: Chronic | ICD-10-CM

## 2018-02-11 LAB
ANION GAP SERPL CALC-SCNC: 14 MMOL/L — SIGNIFICANT CHANGE UP (ref 5–17)
BUN SERPL-MCNC: 15 MG/DL — SIGNIFICANT CHANGE UP (ref 7–23)
CALCIUM SERPL-MCNC: 9.5 MG/DL — SIGNIFICANT CHANGE UP (ref 8.4–10.5)
CHLORIDE SERPL-SCNC: 104 MMOL/L — SIGNIFICANT CHANGE UP (ref 96–108)
CO2 SERPL-SCNC: 23 MMOL/L — SIGNIFICANT CHANGE UP (ref 22–31)
CREAT SERPL-MCNC: 0.87 MG/DL — SIGNIFICANT CHANGE UP (ref 0.5–1.3)
D DIMER BLD IA.RAPID-MCNC: 290 NG/ML DDU — HIGH
GLUCOSE SERPL-MCNC: 87 MG/DL — SIGNIFICANT CHANGE UP (ref 70–99)
HCT VFR BLD CALC: 38.1 % — SIGNIFICANT CHANGE UP (ref 34.5–45)
HGB BLD-MCNC: 13 G/DL — SIGNIFICANT CHANGE UP (ref 11.5–15.5)
MCHC RBC-ENTMCNC: 31.8 PG — SIGNIFICANT CHANGE UP (ref 27–34)
MCHC RBC-ENTMCNC: 34.1 GM/DL — SIGNIFICANT CHANGE UP (ref 32–36)
MCV RBC AUTO: 93.1 FL — SIGNIFICANT CHANGE UP (ref 80–100)
PLATELET # BLD AUTO: 241 K/UL — SIGNIFICANT CHANGE UP (ref 150–400)
POTASSIUM SERPL-MCNC: 3.8 MMOL/L — SIGNIFICANT CHANGE UP (ref 3.5–5.3)
POTASSIUM SERPL-SCNC: 3.8 MMOL/L — SIGNIFICANT CHANGE UP (ref 3.5–5.3)
RBC # BLD: 4.09 M/UL — SIGNIFICANT CHANGE UP (ref 3.8–5.2)
RBC # FLD: 11 % — SIGNIFICANT CHANGE UP (ref 10.3–14.5)
SODIUM SERPL-SCNC: 141 MMOL/L — SIGNIFICANT CHANGE UP (ref 135–145)
WBC # BLD: 9.3 K/UL — SIGNIFICANT CHANGE UP (ref 3.8–10.5)
WBC # FLD AUTO: 9.3 K/UL — SIGNIFICANT CHANGE UP (ref 3.8–10.5)

## 2018-02-11 PROCEDURE — 99285 EMERGENCY DEPT VISIT HI MDM: CPT | Mod: 25,GC

## 2018-02-11 PROCEDURE — 80048 BASIC METABOLIC PNL TOTAL CA: CPT

## 2018-02-11 PROCEDURE — 93005 ELECTROCARDIOGRAM TRACING: CPT

## 2018-02-11 PROCEDURE — 71046 X-RAY EXAM CHEST 2 VIEWS: CPT | Mod: 26

## 2018-02-11 PROCEDURE — 99284 EMERGENCY DEPT VISIT MOD MDM: CPT | Mod: 25

## 2018-02-11 PROCEDURE — 71275 CT ANGIOGRAPHY CHEST: CPT

## 2018-02-11 PROCEDURE — 71275 CT ANGIOGRAPHY CHEST: CPT | Mod: 26

## 2018-02-11 PROCEDURE — 93010 ELECTROCARDIOGRAM REPORT: CPT

## 2018-02-11 PROCEDURE — 71046 X-RAY EXAM CHEST 2 VIEWS: CPT

## 2018-02-11 PROCEDURE — 85379 FIBRIN DEGRADATION QUANT: CPT

## 2018-02-11 PROCEDURE — 85027 COMPLETE CBC AUTOMATED: CPT

## 2018-02-11 NOTE — ED PROVIDER NOTE - PROGRESS NOTE DETAILS
remains with normal VS, labs unremarkable except for D-dimer and CTA negative, HEART score of 1, will d/c with cards Follow up -Slowuche DO

## 2018-02-11 NOTE — ED ADULT NURSE NOTE - CHPI ED SYMPTOMS NEG
no body aches/no chest pain/no cough/no edema/no diaphoresis/no chills/no fever/no headache/no wheezing/no hemoptysis

## 2018-02-11 NOTE — ED PROVIDER NOTE - MEDICAL DECISION MAKING DETAILS
patient no acute distress, exam unremarkable, unable to PERC out due to age but no risk factors, will send d-dimer. check basic labs and ekg with CXR. no infectious sx to be concerned for URI/PNA. unlikely cardiac without any exertional sx or risk factors. had a full work up 2 years ago for same sx without any findings, could be anxiety related.

## 2018-02-11 NOTE — ED PROVIDER NOTE - NS ED ROS FT
ROS: no CP +SOB. no cough. no fever. no n/v/d/c. no abd pain. no rash. no bleeding. no urinary complaints. no weakness. no vision changes. no HA. no neck/back pain. no extremity swelling/deformity. No change in mental status.

## 2018-02-11 NOTE — ED PROVIDER NOTE - ATTENDING CONTRIBUTION TO CARE
68yo F with SOB x3 days, intermittent, not exertional, with non exertional intermittent cp, well appearing, vss, lungs cta b/l, had a pe work up ordered, with elevated dimer, can not perc out, ct angio nl with work up with cath a few yrs ago nl, to have her follow up with pmd.

## 2018-02-11 NOTE — ED PROVIDER NOTE - PLAN OF CARE
1. Return to ED for worsening, progressive or any other concerning symptoms   2. Follow up with your primary care doctor in 2-3days   3. Follow up with cardiology clinic [phone]

## 2018-02-11 NOTE — ED ADULT NURSE NOTE - OBJECTIVE STATEMENT
66 y/o female presents to the ED c/o shortness of breath x 3 days. Pt describes as intermittent, experienced at rest, associated with LL leg cramping in calf up to thigh. Denies recent flight/immobilization, birth control use, leg edema, fever, chills, cough, CP. PT A&Ox3, in no respiratory distress, no accessory muscle use, no cyanosis, lungs CTA b/l. Pt resting comfortably with family at bedside.

## 2018-02-11 NOTE — ED PROVIDER NOTE - CARE PLAN
Principal Discharge DX:	Shortness of breath  Assessment and plan of treatment:	1. Return to ED for worsening, progressive or any other concerning symptoms   2. Follow up with your primary care doctor in 2-3days   3. Follow up with cardiology clinic [phone]

## 2018-08-01 NOTE — ED PROVIDER NOTE - OBJECTIVE STATEMENT
Micky Soni [facility] 77 HISTORY AND PHYSICAL Brooke Be 
MR#: [id number] : 1968 ACCOUNT #: [de-identified] ADMIT DATE: 2018 CHIEF COMPLAINT:  Acute appendicitis. HISTORY OF PRESENT ILLNESS:  This is a 68-year-old  man seen, evaluated and examined on behalf of Tucker Boggs, in the emergency room at [facility]. Patient started to have crampy abdominal pain, more epigastric yesterday afternoon, progressed and worsened through the night, was associated with low-grade fever at home and some nausea and vomiting, came to the emergency room, had workup labs and CT scan. Labs showed a white count of 17.2, hemoglobin and platelet counts were normal.  Urinalysis had some sediment, but did not look to be actively infected. Electrolytes, BUN, creatinine, liver function tests and lipase were normal.  CT scan shows a distended and thickened appendix with periappendiceal stranding consistent with acute appendicitis. Request is made for surgical evaluation. PAST MEDICAL HISTORY:  Negative. PAST SURGICAL HISTORY:  Negative. ALLERGIES:  NO KNOWN DRUG ALLERGIES. MEDICATIONS:  No chronic home medications. SOCIAL HISTORY:  He does not smoke. Rare social drinker. No illicit drug use. REVIEW OF SYSTEMS:  Times 10 is otherwise negative. PHYSICAL EXAMINATION: 
GENERAL:  Reveals a pleasant  man who appears uncomfortable, but not toxic. VITAL SIGNS:  Showed to be afebrile and not tachycardic. HEENT:  Normocephalic, atraumatic. NECK:  Supple, nontender. Trachea midline. There is no JVD, no adenopathy. LUNGS:  Clear to auscultation anteriorly. CARDIOVASCULAR:  Regular rate and rhythm. No murmurs, rubs or gallops appreciated. ABDOMEN:  Flat, soft with mild tenderness, but no guarding, no mass in the right lower quadrant. BACK:  Shows no midline costovertebral angle tenderness or step-off.  
EXTREMITIES:  Show fair range of motion, no 66yo F with SOB x3 days, intermittent, not exertional, comes on random, feels like cant take deep breath. no CP. no nausea/diaphoresis. happened a few years ago with extensive w/u and no etiology determined. no PMH. nonsmoker. no travel/imobilization. no fever/chills/URI sx. +left leg cramping/throbbing back of leg up to thigh- no swelling. no h/o PE/DVT    PCP- Artem Garcia deformity. ASSESSMENT:  Acute appendicitis. Plan for laparoscopic appendectomy. The patient and his wife were counseled for the same, attendant risks including bleeding, infection, conversion to open, removal of normal appendix, need to address other intra-abdominal pathology, possible need for drain placement, prolonged hospitalization, prolonged IV antibiotics, followup CT, CT-guided drainage or reoperation, especially if it were to be perforated. They accept and desire to proceed as outlined. Thank you for your kind referral. 
 
 
MD ANITRA Obando/[place] 
D: 07/31/2018 20:21    
T: 07/31/2018 20:43 JOB #: [id number]

## 2019-01-29 ENCOUNTER — TRANSCRIPTION ENCOUNTER (OUTPATIENT)
Age: 69
End: 2019-01-29

## 2019-04-08 ENCOUNTER — EMERGENCY (EMERGENCY)
Facility: HOSPITAL | Age: 69
LOS: 1 days | Discharge: HOME | End: 2019-04-08
Attending: EMERGENCY MEDICINE
Payer: MEDICARE

## 2019-04-08 VITALS
OXYGEN SATURATION: 100 % | SYSTOLIC BLOOD PRESSURE: 136 MMHG | RESPIRATION RATE: 18 BRPM | DIASTOLIC BLOOD PRESSURE: 79 MMHG | WEIGHT: 121.92 LBS | HEART RATE: 80 BPM | HEIGHT: 55 IN | TEMPERATURE: 98 F

## 2019-04-08 VITALS
TEMPERATURE: 98 F | HEART RATE: 68 BPM | OXYGEN SATURATION: 99 % | RESPIRATION RATE: 18 BRPM | SYSTOLIC BLOOD PRESSURE: 168 MMHG | DIASTOLIC BLOOD PRESSURE: 79 MMHG

## 2019-04-08 DIAGNOSIS — D18.02 HEMANGIOMA OF INTRACRANIAL STRUCTURES: Chronic | ICD-10-CM

## 2019-04-08 LAB
ANION GAP SERPL CALC-SCNC: 10 MMOL/L — SIGNIFICANT CHANGE UP (ref 5–17)
APTT BLD: 25.4 SEC — LOW (ref 27.5–36.3)
BASOPHILS # BLD AUTO: 0.1 K/UL — SIGNIFICANT CHANGE UP (ref 0–0.2)
BASOPHILS NFR BLD AUTO: 0.8 % — SIGNIFICANT CHANGE UP (ref 0–2)
BUN SERPL-MCNC: 18 MG/DL — SIGNIFICANT CHANGE UP (ref 7–23)
CALCIUM SERPL-MCNC: 9.7 MG/DL — SIGNIFICANT CHANGE UP (ref 8.4–10.5)
CHLORIDE SERPL-SCNC: 104 MMOL/L — SIGNIFICANT CHANGE UP (ref 96–108)
CO2 SERPL-SCNC: 26 MMOL/L — SIGNIFICANT CHANGE UP (ref 22–31)
CREAT SERPL-MCNC: 1.03 MG/DL — SIGNIFICANT CHANGE UP (ref 0.5–1.3)
D DIMER BLD IA.RAPID-MCNC: 296 NG/ML DDU — HIGH
EOSINOPHIL # BLD AUTO: 0.3 K/UL — SIGNIFICANT CHANGE UP (ref 0–0.5)
EOSINOPHIL NFR BLD AUTO: 4.3 % — SIGNIFICANT CHANGE UP (ref 0–6)
GLUCOSE SERPL-MCNC: 82 MG/DL — SIGNIFICANT CHANGE UP (ref 70–99)
HCT VFR BLD CALC: 37.3 % — SIGNIFICANT CHANGE UP (ref 34.5–45)
HGB BLD-MCNC: 12.3 G/DL — SIGNIFICANT CHANGE UP (ref 11.5–15.5)
INR BLD: 0.92 RATIO — SIGNIFICANT CHANGE UP (ref 0.88–1.16)
LYMPHOCYTES # BLD AUTO: 2.3 K/UL — SIGNIFICANT CHANGE UP (ref 1–3.3)
LYMPHOCYTES # BLD AUTO: 32.2 % — SIGNIFICANT CHANGE UP (ref 13–44)
MCHC RBC-ENTMCNC: 30.9 PG — SIGNIFICANT CHANGE UP (ref 27–34)
MCHC RBC-ENTMCNC: 32.9 GM/DL — SIGNIFICANT CHANGE UP (ref 32–36)
MCV RBC AUTO: 93.7 FL — SIGNIFICANT CHANGE UP (ref 80–100)
MONOCYTES # BLD AUTO: 0.6 K/UL — SIGNIFICANT CHANGE UP (ref 0–0.9)
MONOCYTES NFR BLD AUTO: 8.8 % — SIGNIFICANT CHANGE UP (ref 2–14)
NEUTROPHILS # BLD AUTO: 3.8 K/UL — SIGNIFICANT CHANGE UP (ref 1.8–7.4)
NEUTROPHILS NFR BLD AUTO: 53.9 % — SIGNIFICANT CHANGE UP (ref 43–77)
PLATELET # BLD AUTO: 273 K/UL — SIGNIFICANT CHANGE UP (ref 150–400)
POTASSIUM SERPL-MCNC: 4.5 MMOL/L — SIGNIFICANT CHANGE UP (ref 3.5–5.3)
POTASSIUM SERPL-SCNC: 4.5 MMOL/L — SIGNIFICANT CHANGE UP (ref 3.5–5.3)
PROTHROM AB SERPL-ACNC: 10.6 SEC — SIGNIFICANT CHANGE UP (ref 10–12.9)
RBC # BLD: 3.98 M/UL — SIGNIFICANT CHANGE UP (ref 3.8–5.2)
RBC # FLD: 11.3 % — SIGNIFICANT CHANGE UP (ref 10.3–14.5)
SODIUM SERPL-SCNC: 140 MMOL/L — SIGNIFICANT CHANGE UP (ref 135–145)
TROPONIN T, HIGH SENSITIVITY RESULT: 17 NG/L — SIGNIFICANT CHANGE UP (ref 0–51)
WBC # BLD: 7 K/UL — SIGNIFICANT CHANGE UP (ref 3.8–10.5)
WBC # FLD AUTO: 7 K/UL — SIGNIFICANT CHANGE UP (ref 3.8–10.5)

## 2019-04-08 PROCEDURE — 80048 BASIC METABOLIC PNL TOTAL CA: CPT

## 2019-04-08 PROCEDURE — 93005 ELECTROCARDIOGRAM TRACING: CPT

## 2019-04-08 PROCEDURE — 99284 EMERGENCY DEPT VISIT MOD MDM: CPT | Mod: 25

## 2019-04-08 PROCEDURE — 85027 COMPLETE CBC AUTOMATED: CPT

## 2019-04-08 PROCEDURE — 85379 FIBRIN DEGRADATION QUANT: CPT

## 2019-04-08 PROCEDURE — 71275 CT ANGIOGRAPHY CHEST: CPT | Mod: 26

## 2019-04-08 PROCEDURE — 85610 PROTHROMBIN TIME: CPT

## 2019-04-08 PROCEDURE — 71275 CT ANGIOGRAPHY CHEST: CPT

## 2019-04-08 PROCEDURE — 85730 THROMBOPLASTIN TIME PARTIAL: CPT

## 2019-04-08 PROCEDURE — 93010 ELECTROCARDIOGRAM REPORT: CPT

## 2019-04-08 PROCEDURE — 84484 ASSAY OF TROPONIN QUANT: CPT

## 2019-04-08 NOTE — ED PROVIDER NOTE - NSFOLLOWUPINSTRUCTIONS_ED_ALL_ED_FT
1- Rest, drink plenty of fluids  2- Follow up with [facility] Paige or our clinic  3- Any new or worsening symptoms come back to the ER immediately

## 2019-04-08 NOTE — ED PROVIDER NOTE - CLINICAL SUMMARY MEDICAL DECISION MAKING FREE TEXT BOX
69y/o F with h/o rotator cuff surgery, (2/2019) and recent travel to [place], presenting with worsening SOB x 2 days; no chest pain, no cough, no fever.  On exam, well appearing in NAD, afebrile, Lungs CTABL, Cardiac nrl s1/s2.  No peripheral edema.  Given recent surgery and travel, possible PE; plan for labs: cbc, cmp, trop, and d-dimer (lower risk by Wells for PE); if d dimer positive will obtain CTA chest.  If trop elevated, will admit but at present this presentation not c/w ACS/unstable-angina.

## 2019-04-08 NOTE — ED ADULT TRIAGE NOTE - CHIEF COMPLAINT QUOTE
SOB starting yesterday while driving in car for 1.5 hours. denies chest pain, cough, fevers, HA, dizziness, new swelling. went to urgent care yesterday and told to come to ED.

## 2019-04-08 NOTE — ED STATDOCS - OBJECTIVE STATEMENT
68 year old F with pshx of rotator cuff surgery (outpatient) presents to ED c/o SOB x2 days. Denies pain when taking a deep breath. Denies MORROW. Similar Sx in past, seen at [facility] last year for same SOB. Went to Urgent Care yesterday where she had an EKG preformed and referred pt to hospital, but pt did not go. Flew in from [place] x2 days ago. Father had a heart attack and  at 59. 68 year old F with pshx of rotator cuff surgery 2019 (outpatient) presents to ED c/o SOB x2 days. Denies pain when taking a deep breath. Denies MORROW. Similar Sx in past, seen at [facility] last year for same SOB. Went to Urgent Care yesterday where she had an EKG preformed and referred pt to hospital, but pt did not go. Flew in from [place] x2 days ago. Father had a heart attack and  at 59.

## 2019-04-08 NOTE — ED PROVIDER NOTE - NSFOLLOWUPCLINICS_GEN_ALL_ED_FT
[facility] Pulmonolgy and Sleep Medicine  Pulmonology  [address] [place] [address]  Phone: [phone]  Fax:   Follow Up Time:

## 2019-04-08 NOTE — ED PROVIDER NOTE - ATTENDING CONTRIBUTION TO CARE
69y/o F with h/o rotator cuff surgery, (2/2019) and recent travel to [place], presenting with worsening SOB x 2 days; no chest pain, no cough, no fever.  On exam, well appearing in NAD, afebrile, Lungs CTABL, Cardiac nrl s1/s2.  No peripheral edema.  Given recent surgery and travel, possible PE; but not tachycardic or hypoxic.      Labs obtained, d dimer mildly elevated; CTA obtained, no acute pathology or PE identified; unclear etiology, but presentation not c/w ACS/angina or acute infectious process.  Will dc, with instructions to f/u with pcp within next 2-3 days.

## 2019-04-08 NOTE — ED PROVIDER NOTE - CARE PROVIDER_API CALL
David Paige)  Critical Care Medicine; Internal Medicine; Pulmonary Disease; Sleep Medicine  [address] [place] [address]  Phone: [phone]  Fax: [phone]  Follow Up Time:

## 2019-04-08 NOTE — ED PROVIDER NOTE - OBJECTIVE STATEMENT
68 year old F with pshx of rotator cuff surgery 2019 (outpatient) presents to ED c/o SOB x2 days. Denies pain when taking a deep breath. Denies MORROW. Similar Sx in past, seen at [facility] last year for same SOB and the workup didn't find anything wrong with her. Went to Urgent Care yesterday where she had an EKG preformed and referred pt to hospital, but pt did not go. Flew in from [place] x2 days ago. Father had a heart attack and  at 59.  Pt currently with symptoms while at rest, nothing makes them better or worse.

## 2019-04-08 NOTE — ED ADULT NURSE NOTE - OBJECTIVE STATEMENT
69 y/o F, reported to ED from home. A&Ox3, c/o SOB. Pt reports that she has been having SOB x 4days. Pt reports that she went to urgent care yesterday and was told to come to the ED immediately. Pt reports that she flew in from [place] and the next day the pain started. Pt reports that said that the SOB is constant. Pt denies C/P or any pain. Pt reports that

## 2019-04-12 ENCOUNTER — APPOINTMENT (OUTPATIENT)
Dept: PULMONOLOGY | Facility: CLINIC | Age: 69
End: 2019-04-12
Payer: MEDICARE

## 2019-04-12 VITALS
WEIGHT: 123 LBS | BODY MASS INDEX: 24.15 KG/M2 | TEMPERATURE: 97.5 F | RESPIRATION RATE: 15 BRPM | HEART RATE: 83 BPM | DIASTOLIC BLOOD PRESSURE: 83 MMHG | OXYGEN SATURATION: 98 % | HEIGHT: 60 IN | SYSTOLIC BLOOD PRESSURE: 151 MMHG

## 2019-04-12 PROCEDURE — ZZZZZ: CPT

## 2019-04-12 PROCEDURE — 94729 DIFFUSING CAPACITY: CPT

## 2019-04-12 PROCEDURE — 94060 EVALUATION OF WHEEZING: CPT

## 2019-04-12 PROCEDURE — 99203 OFFICE O/P NEW LOW 30 MIN: CPT | Mod: 25

## 2019-04-12 PROCEDURE — 94726 PLETHYSMOGRAPHY LUNG VOLUMES: CPT

## 2019-04-12 RX ORDER — ALBUTEROL SULFATE 90 UG/1
108 (90 BASE) AEROSOL, METERED RESPIRATORY (INHALATION)
Qty: 85 | Refills: 0 | Status: DISCONTINUED | COMMUNITY
Start: 2019-01-29

## 2019-04-12 RX ORDER — PSYLLIUM HUSK 0.4 G
CAPSULE ORAL
Refills: 0 | Status: ACTIVE | COMMUNITY

## 2019-04-12 RX ORDER — OXYCODONE AND ACETAMINOPHEN 5; 325 MG/1; MG/1
5-325 TABLET ORAL
Qty: 40 | Refills: 0 | Status: DISCONTINUED | COMMUNITY
Start: 2019-02-13

## 2019-04-12 RX ORDER — CEFADROXIL 500 MG/1
500 CAPSULE ORAL
Qty: 14 | Refills: 0 | Status: DISCONTINUED | COMMUNITY
Start: 2019-02-13

## 2019-04-12 NOTE — PHYSICAL EXAM
[Normal Appearance] : normal appearance [General Appearance - In No Acute Distress] : no acute distress [Normal Conjunctiva] : the conjunctiva exhibited no abnormalities [Normal Oropharynx] : normal oropharynx [Neck Appearance] : the appearance of the neck was normal [Heart Rate And Rhythm] : heart rate and rhythm were normal [Heart Sounds] : normal S1 and S2 [Murmurs] : no murmurs present [Arterial Pulses Normal] : the arterial pulses were normal [Auscultation Breath Sounds / Voice Sounds] : lungs were clear to auscultation bilaterally [Exaggerated Use Of Accessory Muscles For Inspiration] : no accessory muscle use [Respiration, Rhythm And Depth] : normal respiratory rhythm and effort [Nail Clubbing] : no clubbing of the fingernails [Cyanosis, Localized] : no localized cyanosis [Abnormal Walk] : normal gait [] : no rash [No Focal Deficits] : no focal deficits [Affect] : the affect was normal [Mood] : the mood was normal [FreeTextEntry2] : none

## 2019-04-12 NOTE — DISCUSSION/SUMMARY
[FreeTextEntry1] : 67yo female patient nonsmoker with no significant PMH here for initial evaluation for hospital f/u for shortness of breath.  Her lung functions showed normal volume and flow rates with a mildly reduced diffusing capacity.  CTA from hospital showed clear lungs and was negative for PE.  EKG from hospital showed NSR.  Her dyspnea may be r/t to some degree of anxiety.  No medications ordered at this time.  F/u on as needed basis.\par Sounds very much like hyperventilation syndrome\par Explained in detail to patient

## 2019-04-12 NOTE — HISTORY OF PRESENT ILLNESS
[FreeTextEntry1] : 67yo female patient nonsmoker with no significant PMH here for initial evaluation for hospital f/u.  She was at [facility] ED on 4/8 for SOB x2 days.  Found to have mildly elevated D-dimer, however CTA was negative for PE.  She admits to "trouble taking in deep breaths" which occurs sporadically.  However, she denies dyspnea with any exertion.  Denies other respiratory complaints, chest discomfort, leg swelling or any personal cardiac history.  Her father did pass away from MI and her mother does have HTN controlled with medication.  She recently traveled back from [place] and has been dealing with personal matter at home involving multiple family members and has felt slightly overwhelmed.

## 2019-11-19 NOTE — DISCHARGE NOTE ADULT - BECAUSE OF A PHYSICAL, MENTAL OR EMOTIONAL CONDITION, DO YOU HAVE DIFFICULTY DOING  ERRANDS ALONE LIKE VISITING A DOCTOR'S OFFICE OR SHOPPING (15 YEARS AND OLDER)
No Cyclophosphamide Counseling:  I discussed with the patient the risks of cyclophosphamide including but not limited to hair loss, hormonal abnormalities, decreased fertility, abdominal pain, diarrhea, nausea and vomiting, bone marrow suppression and infection. The patient understands that monitoring is required while taking this medication.

## 2020-09-15 ENCOUNTER — TRANSCRIPTION ENCOUNTER (OUTPATIENT)
Age: 70
End: 2020-09-15

## 2021-03-17 ENCOUNTER — TRANSCRIPTION ENCOUNTER (OUTPATIENT)
Age: 71
End: 2021-03-17

## 2021-08-16 ENCOUNTER — EMERGENCY (EMERGENCY)
Facility: HOSPITAL | Age: 71
LOS: 1 days | Discharge: HOME | End: 2021-08-16
Attending: EMERGENCY MEDICINE
Payer: MEDICARE

## 2021-08-16 VITALS
HEIGHT: 65 IN | DIASTOLIC BLOOD PRESSURE: 112 MMHG | RESPIRATION RATE: 18 BRPM | TEMPERATURE: 98 F | HEART RATE: 88 BPM | SYSTOLIC BLOOD PRESSURE: 216 MMHG | WEIGHT: 145.95 LBS

## 2021-08-16 VITALS — DIASTOLIC BLOOD PRESSURE: 100 MMHG | SYSTOLIC BLOOD PRESSURE: 171 MMHG

## 2021-08-16 DIAGNOSIS — R31.9 HEMATURIA, UNSPECIFIED: ICD-10-CM

## 2021-08-16 DIAGNOSIS — D18.02 HEMANGIOMA OF INTRACRANIAL STRUCTURES: Chronic | ICD-10-CM

## 2021-08-16 LAB
A1C WITH ESTIMATED AVERAGE GLUCOSE RESULT: 5.8 % — HIGH (ref 4–5.6)
ALBUMIN SERPL ELPH-MCNC: 4.6 G/DL — SIGNIFICANT CHANGE UP (ref 3.3–5)
ALP SERPL-CCNC: 123 U/L — HIGH (ref 40–120)
ALT FLD-CCNC: 16 U/L — SIGNIFICANT CHANGE UP (ref 10–45)
ANION GAP SERPL CALC-SCNC: 13 MMOL/L — SIGNIFICANT CHANGE UP (ref 5–17)
APPEARANCE UR: CLEAR — SIGNIFICANT CHANGE UP
AST SERPL-CCNC: 29 U/L — SIGNIFICANT CHANGE UP (ref 10–40)
BACTERIA # UR AUTO: NEGATIVE — SIGNIFICANT CHANGE UP
BASOPHILS # BLD AUTO: 0.07 K/UL — SIGNIFICANT CHANGE UP (ref 0–0.2)
BASOPHILS NFR BLD AUTO: 0.8 % — SIGNIFICANT CHANGE UP (ref 0–2)
BILIRUB SERPL-MCNC: 0.2 MG/DL — SIGNIFICANT CHANGE UP (ref 0.2–1.2)
BILIRUB UR-MCNC: NEGATIVE — SIGNIFICANT CHANGE UP
BUN SERPL-MCNC: 23 MG/DL — SIGNIFICANT CHANGE UP (ref 7–23)
CALCIUM SERPL-MCNC: 9.7 MG/DL — SIGNIFICANT CHANGE UP (ref 8.4–10.5)
CHLORIDE SERPL-SCNC: 100 MMOL/L — SIGNIFICANT CHANGE UP (ref 96–108)
CHOLEST SERPL-MCNC: 249 MG/DL — HIGH
CO2 SERPL-SCNC: 23 MMOL/L — SIGNIFICANT CHANGE UP (ref 22–31)
COLOR SPEC: SIGNIFICANT CHANGE UP
CREAT SERPL-MCNC: 0.91 MG/DL — SIGNIFICANT CHANGE UP (ref 0.5–1.3)
DIFF PNL FLD: ABNORMAL
EOSINOPHIL # BLD AUTO: 0.43 K/UL — SIGNIFICANT CHANGE UP (ref 0–0.5)
EOSINOPHIL NFR BLD AUTO: 4.8 % — SIGNIFICANT CHANGE UP (ref 0–6)
EPI CELLS # UR: 1 /HPF — SIGNIFICANT CHANGE UP
ESTIMATED AVERAGE GLUCOSE: 120 MG/DL — HIGH (ref 68–114)
GLUCOSE SERPL-MCNC: 112 MG/DL — HIGH (ref 70–99)
GLUCOSE UR QL: NEGATIVE — SIGNIFICANT CHANGE UP
HCT VFR BLD CALC: 41.1 % — SIGNIFICANT CHANGE UP (ref 34.5–45)
HDLC SERPL-MCNC: 54 MG/DL — SIGNIFICANT CHANGE UP
HGB BLD-MCNC: 13.6 G/DL — SIGNIFICANT CHANGE UP (ref 11.5–15.5)
HYALINE CASTS # UR AUTO: 1 /LPF — SIGNIFICANT CHANGE UP (ref 0–2)
IMM GRANULOCYTES NFR BLD AUTO: 0.1 % — SIGNIFICANT CHANGE UP (ref 0–1.5)
KETONES UR-MCNC: NEGATIVE — SIGNIFICANT CHANGE UP
LEUKOCYTE ESTERASE UR-ACNC: NEGATIVE — SIGNIFICANT CHANGE UP
LIPID PNL WITH DIRECT LDL SERPL: 159 MG/DL — HIGH
LYMPHOCYTES # BLD AUTO: 1.98 K/UL — SIGNIFICANT CHANGE UP (ref 1–3.3)
LYMPHOCYTES # BLD AUTO: 22.1 % — SIGNIFICANT CHANGE UP (ref 13–44)
MCHC RBC-ENTMCNC: 30.2 PG — SIGNIFICANT CHANGE UP (ref 27–34)
MCHC RBC-ENTMCNC: 33.1 GM/DL — SIGNIFICANT CHANGE UP (ref 32–36)
MCV RBC AUTO: 91.1 FL — SIGNIFICANT CHANGE UP (ref 80–100)
MONOCYTES # BLD AUTO: 0.69 K/UL — SIGNIFICANT CHANGE UP (ref 0–0.9)
MONOCYTES NFR BLD AUTO: 7.7 % — SIGNIFICANT CHANGE UP (ref 2–14)
NEUTROPHILS # BLD AUTO: 5.79 K/UL — SIGNIFICANT CHANGE UP (ref 1.8–7.4)
NEUTROPHILS NFR BLD AUTO: 64.5 % — SIGNIFICANT CHANGE UP (ref 43–77)
NITRITE UR-MCNC: NEGATIVE — SIGNIFICANT CHANGE UP
NON HDL CHOLESTEROL: 196 MG/DL — HIGH
NRBC # BLD: 0 /100 WBCS — SIGNIFICANT CHANGE UP (ref 0–0)
PH UR: 6 — SIGNIFICANT CHANGE UP (ref 5–8)
PLATELET # BLD AUTO: 269 K/UL — SIGNIFICANT CHANGE UP (ref 150–400)
POTASSIUM SERPL-MCNC: 4.3 MMOL/L — SIGNIFICANT CHANGE UP (ref 3.5–5.3)
POTASSIUM SERPL-SCNC: 4.3 MMOL/L — SIGNIFICANT CHANGE UP (ref 3.5–5.3)
PROT SERPL-MCNC: 8.4 G/DL — HIGH (ref 6–8.3)
PROT UR-MCNC: ABNORMAL
RBC # BLD: 4.51 M/UL — SIGNIFICANT CHANGE UP (ref 3.8–5.2)
RBC # FLD: 12.2 % — SIGNIFICANT CHANGE UP (ref 10.3–14.5)
RBC CASTS # UR COMP ASSIST: 87 /HPF — HIGH (ref 0–4)
SARS-COV-2 RNA SPEC QL NAA+PROBE: SIGNIFICANT CHANGE UP
SODIUM SERPL-SCNC: 136 MMOL/L — SIGNIFICANT CHANGE UP (ref 135–145)
SP GR SPEC: 1.03 — HIGH (ref 1.01–1.02)
TRIGL SERPL-MCNC: 183 MG/DL — HIGH
TROPONIN T, HIGH SENSITIVITY RESULT: 10 NG/L — SIGNIFICANT CHANGE UP (ref 0–51)
TROPONIN T, HIGH SENSITIVITY RESULT: 11 NG/L — SIGNIFICANT CHANGE UP (ref 0–51)
TROPONIN T, HIGH SENSITIVITY RESULT: 7 NG/L — SIGNIFICANT CHANGE UP (ref 0–51)
UROBILINOGEN FLD QL: NEGATIVE — SIGNIFICANT CHANGE UP
WBC # BLD: 8.97 K/UL — SIGNIFICANT CHANGE UP (ref 3.8–10.5)
WBC # FLD AUTO: 8.97 K/UL — SIGNIFICANT CHANGE UP (ref 3.8–10.5)
WBC UR QL: 3 /HPF — SIGNIFICANT CHANGE UP (ref 0–5)

## 2021-08-16 PROCEDURE — 96376 TX/PRO/DX INJ SAME DRUG ADON: CPT | Mod: XU

## 2021-08-16 PROCEDURE — G1004: CPT

## 2021-08-16 PROCEDURE — 74174 CTA ABD&PLVS W/CONTRAST: CPT | Mod: 26,MG

## 2021-08-16 PROCEDURE — 96374 THER/PROPH/DIAG INJ IV PUSH: CPT | Mod: XU

## 2021-08-16 PROCEDURE — 93005 ELECTROCARDIOGRAM TRACING: CPT

## 2021-08-16 PROCEDURE — 71046 X-RAY EXAM CHEST 2 VIEWS: CPT

## 2021-08-16 PROCEDURE — 93306 TTE W/DOPPLER COMPLETE: CPT | Mod: 26

## 2021-08-16 PROCEDURE — 80053 COMPREHEN METABOLIC PANEL: CPT

## 2021-08-16 PROCEDURE — 81001 URINALYSIS AUTO W/SCOPE: CPT

## 2021-08-16 PROCEDURE — 83036 HEMOGLOBIN GLYCOSYLATED A1C: CPT

## 2021-08-16 PROCEDURE — 93010 ELECTROCARDIOGRAM REPORT: CPT

## 2021-08-16 PROCEDURE — G0378: CPT

## 2021-08-16 PROCEDURE — 99236 HOSP IP/OBS SAME DATE HI 85: CPT

## 2021-08-16 PROCEDURE — 85025 COMPLETE CBC W/AUTO DIFF WBC: CPT

## 2021-08-16 PROCEDURE — 71275 CT ANGIOGRAPHY CHEST: CPT | Mod: 26,MG

## 2021-08-16 PROCEDURE — 84484 ASSAY OF TROPONIN QUANT: CPT

## 2021-08-16 PROCEDURE — 74174 CTA ABD&PLVS W/CONTRAST: CPT | Mod: MG

## 2021-08-16 PROCEDURE — C8929: CPT

## 2021-08-16 PROCEDURE — U0003: CPT

## 2021-08-16 PROCEDURE — 80061 LIPID PANEL: CPT

## 2021-08-16 PROCEDURE — 71275 CT ANGIOGRAPHY CHEST: CPT | Mod: MG

## 2021-08-16 PROCEDURE — 99283 EMERGENCY DEPT VISIT LOW MDM: CPT | Mod: 25

## 2021-08-16 PROCEDURE — 71046 X-RAY EXAM CHEST 2 VIEWS: CPT | Mod: 26

## 2021-08-16 RX ORDER — KETOROLAC TROMETHAMINE 30 MG/ML
15 SYRINGE (ML) INJECTION ONCE
Refills: 0 | Status: DISCONTINUED | OUTPATIENT
Start: 2021-08-16 | End: 2021-08-16

## 2021-08-16 RX ORDER — AMLODIPINE BESYLATE 2.5 MG/1
1 TABLET ORAL
Qty: 30 | Refills: 0
Start: 2021-08-16 | End: 2021-09-14

## 2021-08-16 RX ORDER — CYCLOBENZAPRINE HYDROCHLORIDE 10 MG/1
10 TABLET, FILM COATED ORAL ONCE
Refills: 0 | Status: COMPLETED | OUTPATIENT
Start: 2021-08-16 | End: 2021-08-16

## 2021-08-16 RX ORDER — AMLODIPINE BESYLATE 2.5 MG/1
5 TABLET ORAL ONCE
Refills: 0 | Status: COMPLETED | OUTPATIENT
Start: 2021-08-16 | End: 2021-08-16

## 2021-08-16 RX ORDER — LIDOCAINE 4 G/100G
1 CREAM TOPICAL ONCE
Refills: 0 | Status: COMPLETED | OUTPATIENT
Start: 2021-08-16 | End: 2021-08-16

## 2021-08-16 RX ORDER — ACETAMINOPHEN 500 MG
975 TABLET ORAL ONCE
Refills: 0 | Status: COMPLETED | OUTPATIENT
Start: 2021-08-16 | End: 2021-08-16

## 2021-08-16 RX ORDER — SODIUM CHLORIDE 9 MG/ML
500 INJECTION INTRAMUSCULAR; INTRAVENOUS; SUBCUTANEOUS ONCE
Refills: 0 | Status: COMPLETED | OUTPATIENT
Start: 2021-08-16 | End: 2021-08-16

## 2021-08-16 RX ORDER — LIDOCAINE 4 G/100G
2 CREAM TOPICAL DAILY
Refills: 0 | Status: DISCONTINUED | OUTPATIENT
Start: 2021-08-16 | End: 2021-08-19

## 2021-08-16 RX ORDER — ATORVASTATIN CALCIUM 80 MG/1
1 TABLET, FILM COATED ORAL
Qty: 30 | Refills: 0
Start: 2021-08-16 | End: 2021-09-14

## 2021-08-16 RX ORDER — CYCLOBENZAPRINE HYDROCHLORIDE 10 MG/1
1 TABLET, FILM COATED ORAL
Qty: 9 | Refills: 0
Start: 2021-08-16 | End: 2021-08-18

## 2021-08-16 RX ADMIN — LIDOCAINE 1 PATCH: 4 CREAM TOPICAL at 06:26

## 2021-08-16 RX ADMIN — Medication 975 MILLIGRAM(S): at 07:37

## 2021-08-16 RX ADMIN — LIDOCAINE 2 PATCH: 4 CREAM TOPICAL at 15:24

## 2021-08-16 RX ADMIN — Medication 15 MILLIGRAM(S): at 20:05

## 2021-08-16 RX ADMIN — LIDOCAINE 1 PATCH: 4 CREAM TOPICAL at 20:08

## 2021-08-16 RX ADMIN — AMLODIPINE BESYLATE 5 MILLIGRAM(S): 2.5 TABLET ORAL at 19:59

## 2021-08-16 RX ADMIN — Medication 975 MILLIGRAM(S): at 20:53

## 2021-08-16 RX ADMIN — CYCLOBENZAPRINE HYDROCHLORIDE 10 MILLIGRAM(S): 10 TABLET, FILM COATED ORAL at 15:23

## 2021-08-16 RX ADMIN — Medication 15 MILLIGRAM(S): at 10:49

## 2021-08-16 RX ADMIN — SODIUM CHLORIDE 500 MILLILITER(S): 9 INJECTION INTRAMUSCULAR; INTRAVENOUS; SUBCUTANEOUS at 10:51

## 2021-08-16 NOTE — ED CDU PROVIDER DISPOSITION NOTE - NSFOLLOWUPINSTRUCTIONS_ED_ALL_ED_FT
Your diagnoses at this time was:    Hydrate.     Please take Tylenol 650mg and Motrin 600mg every 6 hours as needed for pain. You can also use Salonpas, which you can buy over the counter, please read all the instructions prior to use.      We recommend you follow up with your primary care provider within the next 2-3 days, please bring all of your results with you. You can also follow up with Dr. Hadley within the next week.  Elias Hadley)  Cardiology  [address] [place] [address], [address]  [place] [address]  Phone: [phone]  Fax: [phone]    Please return to the Emergency Department with new, worsening, or concerning symptoms, such as:  -Shortness of breath or trouble breathing  -Pressure, pain, tightness in chest  -Facial drooping, arm weakness, or speech difficulty   -Head injury or loss of consciousness   -Nonstop bleeding or an open wound     *More detailed information regarding your visit and discharge can be found by reviewing this packet 1. Stay hydrated. rest.   2. Apply lidocaine patch (over the counter) to area of pain upto 12hrs in 24hr period. Take Tylenol 1000mg every 8hrs for pain as needed. Take Flexeril 1 tab every 8hrs for muscle spasm as needed- don't drive after.   Continue Current Home Medications. Take Aspirin 81mg daily. Start Lipitor 40mg daily in setting up elevated cholesterol found on lab work.   3. Follow up with your PCP in 1-2 days. Follow up with your Cardiologist at your Scheduled appointment in September. Follow up elevated cholesterol and elevated HgbA1c (prediabetic range) with your Doctors. Follow low cholesterol diet, low carb/sugar diet.   Follow up abnormalities found on echo - including moderate aortic stenosis.   (Bring printed results to your doctor visits).  4. Return if symptoms, worsen, fever, weakness, chest pain, difficulty breathing, dizziness and all other concerns. 1. Stay hydrated. rest.   2. Apply lidocaine patch (over the counter) to area of pain upto 12hrs in 24hr period. Take Tylenol 1000mg every 8hrs for pain as needed. Take Flexeril 1 tab every 8hrs for muscle spasm as needed- don't drive after.   Continue Current Home Medications. Take Aspirin 81mg daily. Start Lipitor 40mg daily in setting up elevated cholesterol found on lab work. Amlodipine 5mg daily.   3. Follow up with your PCP in 1-2 days. Follow up with your Cardiologist at your Scheduled appointment in September. Follow up elevated cholesterol and elevated HgbA1c (prediabetic range) with your Doctors. Follow low cholesterol diet, low carb/sugar diet.   Follow up abnormalities found on echo - including moderate aortic stenosis.   (Bring printed results to your doctor visits).  4. Return if symptoms, worsen, fever, weakness, chest pain, difficulty breathing, dizziness and all other concerns. 1. Stay hydrated. rest.   2. Apply lidocaine patch (over the counter) to area of pain up to 12hrs in 24hr period. Take Tylenol 1000mg every 8hrs for pain as needed. Take Flexeril 1 tab every 8hrs for muscle spasm as needed- don't drive after.   Continue Current Home Medications. Take Aspirin 81mg daily. Start Lipitor 40mg daily in setting up elevated cholesterol found on lab work. Amlodipine 5mg daily.   3. Follow up with your PCP in 1-2 days. Follow up with your Cardiologist at your Scheduled appointment in September. Follow up elevated cholesterol and elevated HgbA1c (prediabetic range) with your Doctors. Follow low cholesterol diet, low carb/sugar diet.   Follow up abnormalities found on echo - including moderate aortic stenosis.   Follow up abnormalities found on Urine testing - showing hematuria  (Bring printed results to your doctor visits).  4. Return if symptoms, worsen, fever, weakness, chest pain, difficulty breathing, dizziness and all other concerns.

## 2021-08-16 NOTE — ED PROVIDER NOTE - NSFOLLOWUPINSTRUCTIONS_ED_ALL_ED_FT
Chest Pain    Chest pain can be caused by many different conditions which may or may not be dangerous. Causes include heartburn, lung infections, heart attack, blood clot in lungs, skin infections, strain or damage to muscle, cartilage, or bones, etc. In addition to a history and physical examination, an electrocardiogram (ECG) or other lab tests may have been performed to determine the cause of your chest pain. Follow up with your primary care provider or with a cardiologist as instructed.     Contact information for the [facility] is included in your discharge paperwork.     Your lab and imaging results are included in your discharge paperwork.     SEEK IMMEDIATE MEDICAL CARE IF YOU HAVE ANY OF THE FOLLOWING SYMPTOMS: worsening chest pain, coughing up blood, unexplained back/neck/jaw pain, severe abdominal pain, dizziness or lightheadedness, fainting, shortness of breath, sweaty or clammy skin, vomiting, or racing heart beat. These symptoms may represent a serious problem that is an emergency. Do not wait to see if the symptoms will go away. Get medical help right away. Call 911 and do not drive yourself to the hospital. 1. You were seen in the ED for chest pain. Your EKG (heart test) and blood work showed you did not have a heart attack. Your chest x-ray show no immediately concerning abnormalities. It is not entirely clear what caused your pain, however it does not appear to be anything immediately dangerous to you at this time.      2. You may use Tylenol 650mg every 8 hours as needed for pain. These are over the counter medications.      3. We will reach out to you to arrange a follow up appointment with our cardiologists (heart doctors). We would like you to follow up within 1 week. Please also follow up with your primary care doctor in 2-3 days.      4. Return immediately to the ED for return of the chest pain, worsening trouble breathing, nausea/vomiting or sweatiness.

## 2021-08-16 NOTE — ED PROVIDER NOTE - IV ALTEPLASE ADMIN OUTSIDE HIDDEN
On home amlodipine 2.5mg qhs. Initially held due to concerns of sepsis. Normotensive and hypertensive in ED, and following admission to hospital medicine. Will resume antihypertensive medication.    Plan:  -Continue w/ home amlodipine 2.5mg     show

## 2021-08-16 NOTE — CONSULT NOTE ADULT - SUBJECTIVE AND OBJECTIVE BOX
CARDIOLOGY CONSULT - Dr. Hadley         HPI:    69 y/o woman with PMH cerebral meningioma,  nonobstructive CAD, mild myocardial bridge of the LAD,  s/p resection present with back pain and chest pain x 2 days. States that for the past week, becoming more noticeable over the past 2 days she has been having atraumatic mid back pain without radiation. Described as achy. Has been using "patches" on the area for relief. State that yesterday at 4pm she had a disagreement with her neighbor she states that she started feeling antsy and states that she had an episode of "trying to catch her breath but you can't." Says symptoms resolved after laying down on her couch. With the back pain states that she has been feeling chest discomfort?, nonspecific without radiation. Denies current chest pain, current SOB, abdominal pain, fever, cough, nausea, vomiting, urinary complaints, numbness, tingling.   	Per ROSE MARIE, patient had cardiac cath from 2017 after an abnormal stress test showed " Cath with only mild proximal LCx disease and preserved LV function. A portion of the LAD was intramyocardial with mild bridging."  Patient states that she had stress test last year with another provider- was told that there was nothing to do and has not followed up. No cardiologist currently, no PCP. Non-smoker. Father had a MI in his 60s. No current home medications.  ED course: BP elevated, although overall improving. EKG NSR. Trop 7-11. CXR negative for acute pathology. COVID pending. Patient had told providers that she has been having chest pain with radiation to shoulder, now resolved. Spoke with attending, aortic dissection not suspected.  Plan for echo and cardiology consult      Upon eval - pt denies any chest pain, notes she has had chronic back pain in which has worsened lately.  She reports SOB associated with back pain. Also reports general fatigue.  Denies any recent cardiac work up. ROS otherwise neg       PAST MEDICAL & SURGICAL HISTORY:  Cerebral Meningioma  s/p resection 2002    Benign cerebral hemangioma  excision            PREVIOUS DIAGNOSTIC TESTING:    [x ] Echocardiogram: Echo 10/27/10: EF 65%, nl lv sys fx, mild diastolic dysfx  < from: Transthoracic Echocardiogram w/ Doppler (10.27.10 @ 00:42) >  Dimensions:    Normal Values:  LA:     2.3    2.0 - 4.0 cm  Ao:     3.2    2.0 - 3.8 cm  SEPTUM: 0.8    0.6 - 1.2 cm  PWT:    0.7    0.6 - 1.1 cm  LVIDd:  4.0    3.0 - 5.6 cm  LVIDs:  2.6    1.8 - 4.0 cm  Derived variables:  LVMI: 52 g/m2  RWT: 0.35  Fractional short: 35 %  Ejection Fraction: 65 %  Doppler Peak Velocity (m/sec): AoV=1.1  ------------------------------------------------------------------------  Observations:  Mitral Valve: Normal mitral valve.  Aortic Valve/Aorta: Calcified trileaflet aortic valve with  normal opening.  Normal aorticroot.  Left Atrium: Normal left atrium.  Left Ventricle: Normal left ventricular internal dimensions  and wall thicknesses.  Endocardial visualization enhanced with intravenous  injection of echo contrast (Definity). Normal left  ventricular systolicfunction. Mild diastolic dysfunction  (Stage I).  Right Heart: Normal right atrium.  Normal right ventricular size and systolic function.  Normal tricuspid and pulmonic valves.  Pericardium/Pleura: Normal pericardium with no pericardial  effusion.  Doppler:Minimal tricuspid regurgitation. Estimated  pulmonary artery systolic pressure equals 28 mm Hg,  assuming right atrial pressure equals 10  mm Hg. Incomplete  tricuspid regurgitation Doppler envelopes in this study may  underestimate PASP.  ------------------------------------------------------------------------  Conclusions:  1. Endocardial visualization enhanced with intravenous  injection of echo contrast (Definity). Normal left  ventricular systolic function. Mild diastolic dysfunction  (Stage I).  2. Normal right ventricular size and systolic function.  3. Minimal tricuspid regurgitation. Estimated pulmonary  artery systolic pressure equals 28 mm Hg, assuming right  atrial pressure equals 10  mm Hg. Incomplete tricuspid  regurgitation Doppler envelopes in this study may  underestimate PASP.    < end of copied text >    [ ]  Catheterization:  [ ] Stress Test:  	    MEDICATIONS:  Home Medications:  Centrum Women&#x27;s: 1 tab(s) orally once a day (18 Jun 2017 18:51)  Cod Liver oral oil: 5 milliliter(s) orally once a day (18 Jun 2017 18:51)  lactobacillus acidophilus oral capsule: 1 tab(s) orally 3 times a day (21 Jun 2017 13:37)  Systane ophthalmic solution: 1 drop(s) to each affected eye once a day (18 Jun 2017 18:51)      MEDICATIONS  (STANDING):  ketorolac   Injectable 15 milliGRAM(s) IV Push once  sodium chloride 0.9% Bolus 500 milliLiter(s) IV Bolus once      FAMILY HISTORY:  Family history of heart attack    Family history of Alzheimer&#x27;s disease        SOCIAL HISTORY:    [ ] Non-smoker  [ ] Smoker  [ ] Alcohol    Allergies    No Known Allergies    Intolerances    	    REVIEW OF SYSTEMS:  CONSTITUTIONAL: No fever, weight loss, or fatigue  EYES: No eye pain, visual disturbances, or discharge  ENMT:  No difficulty hearing, tinnitus, vertigo; No sinus or throat pain  NECK: No pain or stiffness  RESPIRATORY: No cough, wheezing, chills or hemoptysis; No Shortness of Breath  CARDIOVASCULAR: No chest pain, palpitations, passing out, dizziness, or leg swelling  GASTROINTESTINAL: No abdominal or epigastric pain. No nausea, vomiting, or hematemesis; No diarrhea or constipation. No melena or hematochezia.  GENITOURINARY: No dysuria, frequency, hematuria, or incontinence  NEUROLOGICAL: No headaches, memory loss, loss of strength, numbness, or tremors  SKIN: No itching, burning, rashes, or lesions   	    [ x] All others negative	see hpi   [ ] Unable to obtain    PHYSICAL EXAM:  T(C): 36.7 (08-16-21 @ 10:25), Max: 36.7 (08-16-21 @ 03:32)  HR: 85 (08-16-21 @ 10:25) (83 - 88)  BP: 135/86 (08-16-21 @ 10:25) (135/86 - 216/112)  RR: 16 (08-16-21 @ 10:25) (15 - 18)  SpO2: 100% (08-16-21 @ 10:25) (98% - 100%)  Wt(kg): --  I&O's Summary      Appearance: Normal	  Psychiatry: A & O x 3, Mood & affect appropriate  HEENT:   Normal oral mucosa, PERRL, EOMI	  Lymphatic: No lymphadenopathy  Cardiovascular: Normal S1 S2,RRR, No JVD, No murmurs  Respiratory: Lungs clear to auscultation	  Gastrointestinal:  Soft, Non-tender, + BS	  Skin: No rashes, No ecchymoses, No cyanosis	  Neurologic: Non-focal  Extremities: Normal range of motion, No clubbing, cyanosis or edema  Vascular: Peripheral pulses palpable 2+ bilaterally    TELEMETRY: NSR 	    ECG:  NSR - no acute ischemic chnages 	  RADIOLOGY:  < from: CT Angio Chest Aorta w/wo IV Cont (08.16.21 @ 09:24) >  IV Contrast: 90 cc of Omnipaque 350 were injected 10 cc were discarded  Oral Contrast: None  Complications: None reported    PROCEDURE:  CT Angiography of the Chest, Abdomen and Pelvis.  Gated precontrast imaging was performed through the heart followed by gated CT Angiography of the heart with subsequent non-gated arterial phase imaging of the chest, abdomen and pelvis.  Sagittal and coronal reformats were performed as well as 3D (MIP) reconstructions.    FINDINGS:  CHEST:  LUNGS AND LARGE AIRWAYS: Patent central airways. No pulmonary nodules.  PLEURA: Small area of extrapleural fat along the right posterior pleura, unchanged from 4/8/2019. Tiny left Bochdalek hernia, unchanged.  VESSELS: No intramural hematoma or aortic dissection.  HEART: Heart size is normal. No pericardial effusion.  MEDIASTINUM AND LUIS: No lymphadenopathy.  CHEST WALL AND LOWER NECK: Within normal limits.    ABDOMEN AND PELVIS:  LIVER: Left hepatic lobe hypodensity, too small to characterize.  BILE DUCTS: Normal caliber.  GALLBLADDER: Within normal limits.  SPLEEN: Within normal limits.  PANCREAS: Within normal limits.  ADRENALS: Within normal limits.  KIDNEYS/URETERS: Left pelvic kidney. No hydronephrosis    BLADDER: Within normal limits.  REPRODUCTIVE ORGANS: Uterus and adnexa within normal limits.    BOWEL: No bowel obstruction. Appendix is not visualized. No evidence of inflammation in the pericecal region.  PERITONEUM: No ascites.  VESSELS: No aortic dissection. The celiac artery, SMA, bilateral renal arteries and JE are patent.  RETROPERITONEUM/LYMPH NODES: No lymphadenopathy.  ABDOMINAL WALL: Within normal limits.  BONES: Degenerative changes.    IMPRESSION:  No aortic dissection.    Clear lungs.    No acute pathology in the abdomen/pelvis.    < end of copied text >    OTHER: 	  	  LABS:	 	    CARDIAC MARKERS:  Troponin T, High Sensitivity Result: 11 ng/L (08-16 @ 06:14)  Troponin T, High Sensitivity Result: 7 ng/L (08-16 @ 03:26)                                  13.6   8.97  )-----------( 269      ( 16 Aug 2021 03:26 )             41.1     08-16    136  |  100  |  23  ----------------------------<  112<H>  4.3   |  23  |  0.91    Ca    9.7      16 Aug 2021 03:26    TPro  8.4<H>  /  Alb  4.6  /  TBili  0.2  /  DBili  x   /  AST  29  /  ALT  16  /  AlkPhos  123<H>  08-16      proBNP:   Lipid Profile:   HgA1c:   TSH:

## 2021-08-16 NOTE — ED CDU PROVIDER INITIAL DAY NOTE - OBJECTIVE STATEMENT
69 y/o woman with PMH cerebral meningioma s/p resection present with back pain and chest pain x 2 days. States that for the past week, becoming more noticeable over the past 2 days she has been having atraumatic mid back pain without radiation. Described as achy. Has been using "patches" on the area for relief. State that yesterday at 4pm she had a disagreement with her neighbor she states that she started feeling antsy and states that she had an episode of "trying to catch her breath but you can't." Says symptoms resolved after laying down on her couch. With the back pain states that she has been feeling chest discomfort, nonspecific without radiation. Denies current chest pain, current SOB, abdominal pain, fever, cough, nausea, vomiting, urinary complaints, numbness, tingling.   Per ROSE MARIE, patient had cardiac cath from 2017 after an abnormal stress test showed " Cath with only mild proximal LCx disease and preserved LV function. A portion of the LAD was intramyocardial with mild bridging."  Patient states that she had stress test last year with another provider- was told that there was nothing to do and has not followed up. No cardiologist currently, no PCP. Non-smoker. Father had a MI in his 60s. No current home medications.  ED course: BP elevated, although overall improving. EKG NSR. Trop 7-11. CXR negative for acute pathology. COVID pending. Patient had told providers that she has been having chest pain with radiation to shoulder, now resolved. Plan for echo and cardiology consult. 69 y/o woman with PMH cerebral meningioma s/p resection present with back pain and chest pain x 2 days. States that for the past week, becoming more noticeable over the past 2 days she has been having atraumatic mid back pain without radiation. Described as achy. Has been using "patches" on the area for relief. State that yesterday at 4pm she had a disagreement with her neighbor she states that she started feeling antsy and states that she had an episode of "trying to catch her breath but you can't." Says symptoms resolved after laying down on her couch. With the back pain states that she has been feeling chest discomfort, nonspecific without radiation. Denies current chest pain, current SOB, abdominal pain, fever, cough, nausea, vomiting, urinary complaints, numbness, tingling.   Per ROSE MARIE, patient had cardiac cath from 2017 after an abnormal stress test showed " Cath with only mild proximal LCx disease and preserved LV function. A portion of the LAD was intramyocardial with mild bridging."  Patient states that she had stress test last year with another provider- was told that there was nothing to do and has not followed up. No cardiologist currently, no PCP. Non-smoker. Father had a MI in his 60s. No current home medications.  ED course: BP elevated, although overall improving. EKG NSR. Trop 7-11. CXR negative for acute pathology. COVID pending. Patient had told providers that she has been having chest pain with radiation to shoulder, now resolved. Aortic dissection not suspected.  Plan for echo and cardiology consult. 69 y/o woman with PMH cerebral meningioma s/p resection present with back pain and chest pain x 2 days. States that for the past week, becoming more noticeable over the past 2 days she has been having atraumatic mid back pain without radiation. Described as achy. Has been using "patches" on the area for relief. State that yesterday at 4pm she had a disagreement with her neighbor she states that she started feeling antsy and states that she had an episode of "trying to catch her breath but you can't." Says symptoms resolved after laying down on her couch. With the back pain states that she has been feeling chest discomfort, nonspecific without radiation. Denies current chest pain, current SOB, abdominal pain, fever, cough, nausea, vomiting, urinary complaints, numbness, tingling.   Per ROSE MARIE, patient had cardiac cath from 2017 after an abnormal stress test showed " Cath with only mild proximal LCx disease and preserved LV function. A portion of the LAD was intramyocardial with mild bridging."  Patient states that she had stress test last year with another provider- was told that there was nothing to do and has not followed up. No cardiologist currently, no PCP. Non-smoker. Father had a MI in his 60s. No current home medications.  ED course: BP elevated, although overall improving. EKG NSR. Trop 7-11. CXR negative for acute pathology. COVID pending. Patient had told providers that she has been having chest pain with radiation to shoulder, now resolved. Spoke with attending, aortic dissection not suspected.  Plan for echo and cardiology consult. 69 y/o woman with PMH cerebral meningioma s/p resection present with back pain and chest pain x 2 days. States that for the past week, becoming more noticeable over the past 2 days she has been having atraumatic mid back pain without radiation. Described as achy. Has been using "patches" on the area for relief. State that yesterday at 4pm she had a disagreement with her neighbor she states that she started feeling antsy and states that she had an episode of "trying to catch her breath but you can't." Says symptoms resolved after laying down on her couch. With the back pain states that she has been feeling chest discomfort?, nonspecific without radiation. Denies current chest pain, current SOB, abdominal pain, fever, cough, nausea, vomiting, urinary complaints, numbness, tingling.   Per ROSE MARIE, patient had cardiac cath from 2017 after an abnormal stress test showed " Cath with only mild proximal LCx disease and preserved LV function. A portion of the LAD was intramyocardial with mild bridging."  Patient states that she had stress test last year with another provider- was told that there was nothing to do and has not followed up. No cardiologist currently, no PCP. Non-smoker. Father had a MI in his 60s. No current home medications.  ED course: BP elevated, although overall improving. EKG NSR. Trop 7-11. CXR negative for acute pathology. COVID pending. Patient had told providers that she has been having chest pain with radiation to shoulder, now resolved. Spoke with attending, aortic dissection not suspected.  Plan for echo and cardiology consult.

## 2021-08-16 NOTE — ED CDU PROVIDER INITIAL DAY NOTE - PHYSICAL EXAMINATION
GEN: Well Appearing, Nontoxic, NAD  HEENT: NC/AT, Symm Facies.   CV: No JVD/Bruits or stridor;  +S1S2, RRR w/o m/g/r  RESP: CTAB w/o w/r/r  ABD: Soft, nt/nd  EXT/MSK: No lower extremity edema or calf tenderness. +equal palpable radial pulses. FROMx4  BACK: No midline spinal tenderness to palpation.   PSYCH: Appropriate mood and affect   Neuro: Grossly intact, AOX3 with normal speech,  Sensation grossly intact, strength equal b/l LE 5/5, negative straight leg raise

## 2021-08-16 NOTE — CONSULT NOTE ADULT - TIME BILLING
Agree with above NP note.  69 y/o woman with PMH cerebral meningioma,  nonobstructive CAD, mild myocardial bridge of the LAD,  s/p resection present with back pain and chest pain x 2 days.    #Chest Pain  -likely in setting of elevated bp   -resolved  -pt noted w hx of mild myocardial bridge of LAD   -hsT neg, ekg without acute ischemic changes   -CT Angio Abd neg for aortic dissection   -echo unremarkable     #HTN  -elevated in setting of stress  -bp improved   -cont to monitor     dvt ppx    add norvasc  add statin    f/u w Dr. Obi Hadley on 9/28/21 @ 115pm     d/w CDU

## 2021-08-16 NOTE — CONSULT NOTE ADULT - ASSESSMENT
a/p  71 y/o woman with PMH cerebral meningioma,  nonobstructive CAD, mild myocardial bridge of the LAD,  s/p resection present with back pain and chest pain x 2 days.    #Chest Pain  -likely in setting of elevated bp   -self resolved  -pt noted w hx of mild myocardial bridge of LAD   -hsT neg, ekg without acute ischemic changes   -CT Angio Abd neg for aortic dissection   -pending echo     #HTN  -elevated in setting of stress  -bp improved   -cont to monitor     dvt ppx    d/w CDU  a/p  71 y/o woman with PMH cerebral meningioma,  nonobstructive CAD, mild myocardial bridge of the LAD,  s/p resection present with back pain and chest pain x 2 days.    #Chest Pain  -likely in setting of elevated bp   -self resolved  -pt noted w hx of mild myocardial bridge of LAD   -hsT neg, ekg without acute ischemic changes   -CT Angio Abd neg for aortic dissection   -pending echo     #HTN  -elevated in setting of stress  -bp improved   -cont to monitor     dvt ppx  DCP pending echo     pt to f/u w Dr. Obi Hadley on 9/28/21 @ 115pm     d/w CDU

## 2021-08-16 NOTE — ED PROVIDER NOTE - ATTENDING CONTRIBUTION TO CARE
Attending MD Chauhan:  I personally have seen and examined this patient.  Resident note reviewed and agree on plan of care and except where noted.  See HPI, PE, and MDM for details.

## 2021-08-16 NOTE — ED CDU PROVIDER DISPOSITION NOTE - PATIENT PORTAL LINK FT
You can access the FollowMyHealth Patient Portal offered by Health system by registering at the following website: [email or web address]. By joining [facility]’s FollowMyHealth portal, you will also be able to view your health information using other applications (apps) compatible with our system.

## 2021-08-16 NOTE — ED CDU PROVIDER INITIAL DAY NOTE - ATTENDING CONTRIBUTION TO CARE
Attending MD Chauhan:   I personally have seen and examined this patient.  Physician assistant note reviewed and agree on plan of care and except where noted.  See HPI, PE, and MDM for details.

## 2021-08-16 NOTE — ED PROVIDER NOTE - OBJECTIVE STATEMENT
69yo woman with previous episode of atypical chest pain presenting due to chest pressure of 2 days duration. Patient was at rest when pressure started. Radiating to right arm presenting as right arm pain. Patient states that she has had episodes like this in the past, gets checked out every time with no specific reason for pain/pressure found. Feels SOB with symptoms. Denies fever, N/V.

## 2021-08-16 NOTE — ED PROVIDER NOTE - CLINICAL SUMMARY MEDICAL DECISION MAKING FREE TEXT BOX
George, PGY1 - 71yo woman with myocardial bridging found in 2017 cath and hx of chest pain and SOB episodes presenting with chest pain and SOB of 2 days duration. CBC, CMP normal. CXR --------, decreased suspicion for pneumonia, pneumothorax. Troponin 7. Repeat troponin --------, decreased suspicion for acute MI. Will d/c with PCP and Cardio follow-up. Attending MD Chauhan:  70F with acute chest pain and dyspnea, resolved now. ECG with no diagnostic acute ischemic changes. was hypertensive in triage but BPs downtrended without targeted intervention. Plan for rule out ACS with serial biomarkers. chart review reveals patient underwent cardiac cath after abnormal nuclear stress in 2017 with "myocardial bridging" to LAD and 30% LCX lesion, otherwise clean  cath. HEART score is 3-4 pending troponin results. If biomarkers are negative, patient is appropriate for outpatient cardiology follow up. Aortic dissection is not suspected in this patient, PE is not suspected.        *The above represents an initial assessment/impression. Please refer to progress notes for potential changes in patient clinical course* Attending MD Chauhan:  70F with acute chest pain and dyspnea, resolved now. ECG with no diagnostic acute ischemic changes. was hypertensive in triage but BPs downtrended without targeted intervention. Plan for rule out ACS with serial biomarkers. chart review reveals patient underwent cardiac cath after abnormal nuclear stress in 2017 with "myocardial bridging" to LAD and 30% LCX lesion, otherwise clean  cath. HEART score is 3-4 pending troponin results. If biomarkers are negative, patient is appropriate for outpatient cardiology follow up. Aortic dissection is not suspected in this patient, PE is not suspected.        *The above represents an initial assessment/impression. Please refer to progress notes for potential changes in patient clinical course*    George, PGY1 - Troponin increase from 7 to 11. Admitting to CDU for monitoring and cardiology consult.

## 2021-08-16 NOTE — ED CDU PROVIDER INITIAL DAY NOTE - PROGRESS NOTE DETAILS
Left call back information with answering service with Dr. Elias Hadley for consult- saw patient on last cardiac admission. - Elina Truong PA-C Spoke with Dr. Hadley who states that he will see patient for consult. - Elina Truong PA-C Spoke with Dr. Lockhart, who reevaluated patient, states that patient should have dissection study-if possible to protocol with CTC. Spoke with CT, states that I should call [phone] in 10 min to see if possible. - Elina Truong PA-C Spoke with CTC and Dr. Perez-concern that it would take too long for patient's HR to come down (has been in the 80s) which could prolong patient obtaining CTC. DW Dr. Lockhart, will obtain dissection study at this time and reeval for possible stress test, cards consult pending. Patient agrees to CT scans. CT tech made aware.  - Elina Truong PA-C Spoke with CTC and Dr. Perez-concern that it would take too long for patient's HR to come down (has been in the 80s) which could prolong patient obtaining dissection study. DW Dr. Lockhart, will obtain dissection study at this time and reeval for possible stress test, cards consult pending. Patient agrees to CT scans. CT tech made aware.  - Elina Truong PA-C Call from Cardiology NP Nila, states that patient denied chest pain during her evaluation. States that symptoms could be 2/2 MSK v. cardiac bridging found on last cath. OK keeping systolic -160 and will normal echo likely can follow up outpatient in office. Spoke with radiology, no dissection seen. - Elina Truong PA-C Call from Cardiology NP Nila, states that patient denied chest pain during her evaluation. States that symptoms could be 2/2 MSK v. cardiac bridging found on last cath. OK keeping systolic -160 and will normal echo likely can follow up outpatient in office. Spoke with radiology, no dissection seen. Spoke with echo, will expedite study. - Elina Truong PA-C CDU NOTE VIPIN Arredondo: pt resting comfortably, feels improved. wants to go home. NAD VSS. no events on tele.  echo results in, reviewed with pt's Cardiologist Dr. Obi Hadley, ok to d/c home. ok to start Lipitor 40mg daily  (in setting of high cholesterol) and ASA 81mg daily.   will discuss with CDU attending Dr. Moscoso and plan for d/c home. CDU NOTE VIPIN Arredondo: pt resting comfortably, feels improved. wants to go home. NAD VSS. no events on tele.  echo results in, reviewed with pt's Cardiologist Dr. Obi Hadley, ok to d/c home. ok to start Lipitor 40mg daily  (in setting of high cholesterol) and ASA 81mg daily and amlodipine 5mg daily.   will discuss with CDU attending Dr. Moscoso and plan for d/c home. CDU PROGRESS NOTE VIPIN RAMIREZ: Received pt at sign-out. Case/plan reviewed. Pt was c/o lower back pain, 8/10 in severity and was medicated Toradol 15mg IVP, Tylenol 975mg po. Pt currently states pain has subsided and feeling better. Urinalysis (+) hematuria, no signs of infection. Pt denies having dysuria, fever, chills, numbness, weakness or other complaints. Pt ambulatory around unit with steady gait. S1 S2 noted, RRR, lungs CTA b/l, BS x4 with soft, nontender abdomen. c/d/w Dr. Moscoso, will advise patient to f/u with PCP and her Cardiologist, Dr. Hadley for further monitoring and evaluation. Patient is aox3, speaking full coherent sentences with no signs of distress noted. Pt verbalized understanding of d/c instructions.

## 2021-08-16 NOTE — ED CDU PROVIDER INITIAL DAY NOTE - NS ED ROS FT
Constitutional: No fever or chills  Eyes: No visual changes, eye pain   CV: + chest pain or lower extremity edema  Resp: + SOB no cough  GI: No abd pain. No nausea or vomiting. No diarrhea.   : No dysuria, hematuria.   MSK: +back pain   Skin: No rash  Psych: No complaints   Neuro: No headache. No numbness or tingling.

## 2021-08-16 NOTE — ED CDU PROVIDER INITIAL DAY NOTE - DETAILS
Chest pain/Back pain  -Pain control  -Echo  -Cardiology consult  -Tele  -Vitals every 4 hours, frequent reevaluations, VIVIAN Chauhan

## 2021-08-16 NOTE — ED ADULT NURSE NOTE - OBJECTIVE STATEMENT
71yo woman with previous episode of atypical chest pain presenting due to chest pressure of 2 days duration. Patient was at rest when pressure started. Radiating to right arm presenting as right arm pain. Patient states that she has had episodes like this in the past, gets checked out every time with no specific reason for pain/pressure found. Feels SOB with symptoms. Denies fever, N/V.

## 2021-08-16 NOTE — ED PROVIDER NOTE - PHYSICAL EXAMINATION
Gen: NAD, AOx3, able to make needs known, non-toxic  Head: NCAT  HEENT: pupils equal and reactive to light, EOMI, oral mucosa moist, normal conjunctiva  Lung: CTAB, no respiratory distress, no wheezes/rhonchi/rales B/L, speaking in full sentences  CV: RRR, no M/R/G, pulses bilaterally   Abd: soft, NTND, no guarding, no CVA tenderness, BSX4  MSK: no visible deformities  Neuro: No focal sensory or motor deficits  Skin: Warm, well perfused, no rash  Psych: normal affect Gen: NAD, AOx3, able to make needs known, non-toxic  Head: NCAT  HEENT: EOMI, normal conjunctiva  Lung: CTAB, no respiratory distress, no wheezes/rhonchi/rales B/L, speaking in full sentences  CV: RRR, no M/R/G, pulses bilaterally   Abd: soft, NTND, no guarding, no CVA tenderness  MSK: no visible deformities  Neuro: No focal sensory or motor deficits  Skin: Warm, well perfused, no rash  Psych: normal affect

## 2021-08-16 NOTE — ED CDU PROVIDER DISPOSITION NOTE - ATTENDING CONTRIBUTION TO CARE
CDU Attending Note -- Pt seen and examined at bedside.  Case discussed c CDU PA.  Labs/imaging including CT-A chest reviewed.  Pt comfortable, asymptomatic, and has good follow up.  Seen by Dr. Hadley, recs appreciated; no indication for further workup or treatment in the CDU or inpatient.  At this time there is no further work-up or treatment required to necessitate further CDU monitoring or admission.  Strict return precautions provided to patient.  Will discharge.  --ISAI

## 2021-08-16 NOTE — ED ADULT NURSE REASSESSMENT NOTE - NS ED NURSE REASSESS COMMENT FT1
Report received from RN Peterson. Pt received A&Ox3, IV intact and patent, pt hypertensive, c/o 9/10 back pain that is achy in nature, MD Correa made aware of BP, as per MD Correa no RN interventions required at this time for BP, pt asymptomatic, denies chest pain/discomfort, SOB/difficulty breathing, HA/visual changes, lightheadedness/dizziness, numbness/tingling, bed locked and in lowest position, call bell within reach and pt instructed on use, safety and comfort measures maintained. Report received from DINO Winkler. Pt received A&Ox3, IV intact and patent, pt hypertensive, c/o 9/10 back pain that is achy in nature, pt medicated w/ tylenol for pain as per MD order, MD Correa made aware of BP, as per MD Correa no RN interventions required at this time for BP, pt asymptomatic, denies chest pain/discomfort, SOB/difficulty breathing, HA/visual changes, lightheadedness/dizziness, numbness/tingling, bed locked and in lowest position, call bell within reach and pt instructed on use, safety and comfort measures maintained.

## 2021-08-16 NOTE — ED PROVIDER NOTE - NS ED ROS FT
GENERAL: No fever, no chills  EYES: No change in vision  HEENT: No trouble swallowing or speaking  CARDIAC: +chest pain  PULMONARY: No cough, no SOB  GI: No abdominal pain, no nausea or no vomiting, no diarrhea, no constipation  : No changes in urination  SKIN: No rashes  NEURO: No headache, no numbness  MSK: No joint pain  Otherwise as HPI or negative. GENERAL: No fever, no chills  EYES: No change in vision  HEENT: No trouble swallowing or speaking  CARDIAC: +chest pain  PULMONARY: No cough, +SOB  GI: No abdominal pain, no nausea or no vomiting, no diarrhea, no constipation  : No changes in urination  SKIN: No rashes  NEURO: No headache, no numbness  MSK: No joint pain  Otherwise as HPI or negative.

## 2021-08-16 NOTE — ED PROVIDER NOTE - CHIEF COMPLAINT
The patient is a 70y Female complaining of chest pain. Pre-Excision Curettage Text (Leave Blank If You Do Not Want): Prior to drawing the surgical margin the visible lesion was removed with electrodesiccation and curettage to clearly define the lesion size.

## 2021-08-16 NOTE — ED CDU PROVIDER DISPOSITION NOTE - CLINICAL COURSE
71 y/o woman with PMH cerebral meningioma s/p resection present with back pain and chest pain x 2 days. States that for the past week, becoming more noticeable over the past 2 days she has been having atraumatic mid back pain without radiation. Described as achy. Has been using "patches" on the area for relief. State that yesterday at 4pm she had a disagreement with her neighbor she states that she started feeling antsy and states that she had an episode of "trying to catch her breath but you can't." Says symptoms resolved after laying down on her couch. With the back pain states that she has been feeling chest discomfort?, nonspecific without radiation. Denies current chest pain, current SOB, abdominal pain, fever, cough, nausea, vomiting, urinary complaints, numbness, tingling.   	Per ROSE MARIE, patient had cardiac cath from 2017 after an abnormal stress test showed " Cath with only mild proximal LCx disease and preserved LV function. A portion of the LAD was intramyocardial with mild bridging."  Patient states that she had stress test last year with another provider- was told that there was nothing to do and has not followed up. No cardiologist currently, no PCP. Non-smoker. Father had a MI in his 60s. No current home medications.  ED course: BP elevated, although overall improving. EKG NSR. Trop 7-11. CXR negative for acute pathology. COVID pending. Patient had told providers that she has been having chest pain with radiation to shoulder, now resolved. Spoke with attending, aortic dissection not suspected.  Plan for echo and cardiology consult.  CTA chest/abdomen/pelvis negative for acute dissection. 71 y/o woman with PMH cerebral meningioma s/p resection present with back pain and chest pain x 2 days. States that for the past week, becoming more noticeable over the past 2 days she has been having atraumatic mid back pain without radiation. Described as achy. Has been using "patches" on the area for relief. State that yesterday at 4pm she had a disagreement with her neighbor she states that she started feeling antsy and states that she had an episode of "trying to catch her breath but you can't." Says symptoms resolved after laying down on her couch. With the back pain states that she has been feeling chest discomfort?, nonspecific without radiation. Denies current chest pain, current SOB, abdominal pain, fever, cough, nausea, vomiting, urinary complaints, numbness, tingling.   	Per ROSE MARIE, patient had cardiac cath from 2017 after an abnormal stress test showed " Cath with only mild proximal LCx disease and preserved LV function. A portion of the LAD was intramyocardial with mild bridging."  Patient states that she had stress test last year with another provider- was told that there was nothing to do and has not followed up. No cardiologist currently, no PCP. Non-smoker. Father had a MI in his 60s. No current home medications.  ED course: BP elevated, although overall improving. EKG NSR. Trop 7-11. CXR negative for acute pathology. COVID pending. Patient had told providers that she has been having chest pain with radiation to shoulder, now resolved. Spoke with attending, aortic dissection not suspected.  Plan for echo and cardiology consult.  CTA chest/abdomen/pelvis negative for acute dissection. in cdu, no events on tele. echo- no emergent findings. +moderate AS. +diastolic dysfunction. +high cholesterol. prediabetes on a1c. reviewed echo and cholesterol results with pt's cardiologist, ok to d/c home and f/up with him in his office. 69 y/o woman with PMH cerebral meningioma s/p resection present with back pain and chest pain x 2 days. States that for the past week, becoming more noticeable over the past 2 days she has been having atraumatic mid back pain without radiation. Described as achy. Has been using "patches" on the area for relief. State that yesterday at 4pm she had a disagreement with her neighbor she states that she started feeling antsy and states that she had an episode of "trying to catch her breath but you can't." Says symptoms resolved after laying down on her couch. With the back pain states that she has been feeling chest discomfort?, nonspecific without radiation.   ED course: BP elevated, although overall improving. EKG NSR. Trop 7-11. CXR negative for acute pathology. COVID pending. Patient had told providers that she has been having chest pain with radiation to shoulder, now resolved. Spoke with attending, aortic dissection not suspected.  Plan for echo and cardiology consult.  CTA chest/abdomen/pelvis negative for acute dissection. in cdu, no events on tele. echo- no emergent findings. +moderate AS. +diastolic dysfunction. +high cholesterol. prediabetes on a1c. reviewed echo and cholesterol results with pt's cardiologist, ok to d/c home and f/up with him in his office. Urinalysis (+) hematuria, no signs of infection. Pt denies having dysuria, fever, chills, numbness, weakness or other complaints. Pt ambulatory around unit with steady gait. c/d/w Dr. Moscoso, will advise patient to f/u with PCP and her Cardiologist, Dr. Hadley for further monitoring and evaluation. Patient is aox3, speaking full coherent sentences with no signs of distress noted. Pt verbalized understanding of d/c instructions.

## 2021-08-16 NOTE — ED ADULT NURSE NOTE - CAS EDN DISCHARGE INTERVENTIONS
----- Message from Tova Jones sent at 7/9/2018  9:48 AM EDT -----  Regarding: Misa Chino: [phone]  Pt mother calling to check to see if copys of Rx and also needs letter to allow pt ro fly with needles and other supplies. Arm band on/IV intact IV discontinued, cath removed intact

## 2021-08-16 NOTE — ED ADULT NURSE REASSESSMENT NOTE - NS ED NURSE REASSESS COMMENT FT1
11.00 Am Received the Pt from  RN Mesha Delgado . Pt is Observed for Chest pain /Back pain For ECHO . Received the Pt A&OX 4 obeys commands Fatimah N/V/D ever chills cp SOB   Comfort care & safety measures continued  IV site looks clean & dry no signs of infiltration noted pt denies  pain IV site .  Pt is advised to call for help  call bell with in the reach pt verbalized the understanding . Pt states  he has minimal pain in the back 4/10   Medicated as per order . Pt not in any distress pending CDU  MD gordon . GCS 15/15 A&OX 4 PERRLA  size 3 Strong upper & lower extremities steady gait   No facial droop  No Hand Leg drop denies numbness tingling Continue to monitor

## 2021-08-16 NOTE — ED ADULT TRIAGE NOTE - WEIGHT IN KG
Prescription approved per [facility] [place]  Rafia Santos RN BS     66.2

## 2021-08-16 NOTE — ED ADULT NURSE REASSESSMENT NOTE - NS ED NURSE REASSESS COMMENT FT1
@1900 Pt received from DINO Albright. Pt oriented to CDU & plan of care was discussed. Pt A&O x 4.  Pt c/o 8/10 back pain, medicated as per MAR. Pt denies any chest pain, SOB, dizziness or palpitations. V/S stable, pt afebrile,  IV in place, patent and free of signs of infiltration. Pt resting in bed. Safety & comfort measures maintained. Call bell in reach. Will continue to monitor.    @2200 Pt discharged as per provider. teaching provided by VIPIN Sterling. Pt verbalizes understanding to discharge orders & will follow up with PMD post discharge. IV lock removed. No bleeding noted. Pt stable upon discharge.

## 2021-08-27 ENCOUNTER — TRANSCRIPTION ENCOUNTER (OUTPATIENT)
Age: 71
End: 2021-08-27

## 2022-01-27 ENCOUNTER — APPOINTMENT (OUTPATIENT)
Dept: INTERNAL MEDICINE | Facility: CLINIC | Age: 72
End: 2022-01-27
Payer: MEDICARE

## 2022-01-27 ENCOUNTER — NON-APPOINTMENT (OUTPATIENT)
Age: 72
End: 2022-01-27

## 2022-01-27 VITALS
WEIGHT: 136 LBS | DIASTOLIC BLOOD PRESSURE: 81 MMHG | OXYGEN SATURATION: 98 % | SYSTOLIC BLOOD PRESSURE: 163 MMHG | RESPIRATION RATE: 14 BRPM | TEMPERATURE: 96.7 F | HEART RATE: 76 BPM | BODY MASS INDEX: 26.56 KG/M2

## 2022-01-27 VITALS — DIASTOLIC BLOOD PRESSURE: 84 MMHG | SYSTOLIC BLOOD PRESSURE: 142 MMHG

## 2022-01-27 VITALS — DIASTOLIC BLOOD PRESSURE: 74 MMHG | SYSTOLIC BLOOD PRESSURE: 142 MMHG

## 2022-01-27 DIAGNOSIS — Z00.00 ENCOUNTER FOR GENERAL ADULT MEDICAL EXAMINATION W/OUT ABNORMAL FINDINGS: ICD-10-CM

## 2022-01-27 DIAGNOSIS — Z23 ENCOUNTER FOR IMMUNIZATION: ICD-10-CM

## 2022-01-27 PROCEDURE — G0439: CPT

## 2022-01-27 PROCEDURE — 93000 ELECTROCARDIOGRAM COMPLETE: CPT

## 2022-01-27 NOTE — PHYSICAL EXAM
[No Acute Distress] : no acute distress [Well Nourished] : well nourished [Normal Sclera/Conjunctiva] : normal sclera/conjunctiva [EOMI] : extraocular movements intact [Normal Outer Ear/Nose] : the outer ears and nose were normal in appearance [Normal Oropharynx] : the oropharynx was normal [No JVD] : no jugular venous distention [No Lymphadenopathy] : no lymphadenopathy [Supple] : supple [Thyroid Normal, No Nodules] : the thyroid was normal and there were no nodules present [No Respiratory Distress] : no respiratory distress  [No Accessory Muscle Use] : no accessory muscle use [Clear to Auscultation] : lungs were clear to auscultation bilaterally [Normal Rate] : normal rate  [Regular Rhythm] : with a regular rhythm [Normal S1, S2] : normal S1 and S2 [No Murmur] : no murmur heard [No Carotid Bruits] : no carotid bruits [Pedal Pulses Present] : the pedal pulses are present [No Edema] : there was no peripheral edema [No Extremity Clubbing/Cyanosis] : no extremity clubbing/cyanosis [Soft] : abdomen soft [Non Tender] : non-tender [Non-distended] : non-distended [Normal Bowel Sounds] : normal bowel sounds [Normal Posterior Cervical Nodes] : no posterior cervical lymphadenopathy [Normal Anterior Cervical Nodes] : no anterior cervical lymphadenopathy [No CVA Tenderness] : no CVA  tenderness [No Spinal Tenderness] : no spinal tenderness [No Joint Swelling] : no joint swelling [Grossly Normal Strength/Tone] : grossly normal strength/tone [No Rash] : no rash [Coordination Grossly Intact] : coordination grossly intact [No Focal Deficits] : no focal deficits [Normal Gait] : normal gait [Normal Affect] : the affect was normal [Normal Insight/Judgement] : insight and judgment were intact

## 2022-01-30 NOTE — HEALTH RISK ASSESSMENT
[Never] : Never [No] : No [Patient reported mammogram was normal] : Patient reported mammogram was normal [Patient reported PAP Smear was normal] : Patient reported PAP Smear was normal [Patient reported colonoscopy was normal] : Patient reported colonoscopy was normal [With Family] : lives with family [Retired] : retired [] :  [# Of Children ___] : has [unfilled] children [No falls in past year] : Patient reported no falls in the past year [0] : 2) Feeling down, depressed, or hopeless: Not at all (0) [Fully functional (bathing, dressing, toileting, transferring, walking, feeding)] : Fully functional (bathing, dressing, toileting, transferring, walking, feeding) [Fully functional (using the telephone, shopping, preparing meals, housekeeping, doing laundry, using] : Fully functional and needs no help or supervision to perform IADLs (using the telephone, shopping, preparing meals, housekeeping, doing laundry, using transportation, managing medications and managing finances) [MammogramDate] : 2020 [PapSmearDate] : 2021 [BoneDensityDate] : > 3 years ago [ColonoscopyDate] : 3 years ago

## 2022-01-30 NOTE — ASSESSMENT
[FreeTextEntry1] : Physical \par \par She is UTD with her colonoscopy and gyn\par Referral for Mammo and Bone density \par \par borderline SBP- Denies any HA, dizziness, visual changes, or weakness \par Low sodium diet\par \par MORROW \par cardiology referral \par EKG today: non specific T wave inversion\par referred to cardiology\par \par Weight management, Healthy food choices and increased physical activity discussed\par \par blood work ordered\par \par follow up in one week for lab results

## 2022-01-30 NOTE — HISTORY OF PRESENT ILLNESS
[de-identified] : Ms. ROSALINE PLASCENCIA is a 71 year old female accompanied by daughter with no significant medical history  presents for annual physical\par \par She is doing well. \par Maintains a healthy diet, and is very active\par Denies SOB, chest pain, abdominal pain, N/V/D, leg swelling, headache, dizziness \par \par She does reports that she has moments where she feels anxious, gets episodes of palpitations associated with SOB. Daughter reports she was seen in the ED for these episodes in the past and was told it was a panic attack\par As per daughter had seen cardiology few months ago, and had work up and everything was normal\par

## 2022-02-06 LAB
25(OH)D3 SERPL-MCNC: 37.1 NG/ML
ALBUMIN SERPL ELPH-MCNC: 4.4 G/DL
ALP BLD-CCNC: 105 U/L
ALT SERPL-CCNC: 16 U/L
ANION GAP SERPL CALC-SCNC: 16 MMOL/L
AST SERPL-CCNC: 33 U/L
BILIRUB SERPL-MCNC: 0.4 MG/DL
BUN SERPL-MCNC: 22 MG/DL
CALCIUM SERPL-MCNC: 10.1 MG/DL
CHLORIDE SERPL-SCNC: 100 MMOL/L
CO2 SERPL-SCNC: 22 MMOL/L
COVID-19 NUCLEOCAPSID  GAM ANTIBODY INTERPRETATION: POSITIVE
COVID-19 SPIKE DOMAIN ANTIBODY INTERPRETATION: POSITIVE
CREAT SERPL-MCNC: 1.05 MG/DL
ESTIMATED AVERAGE GLUCOSE: 123 MG/DL
GLUCOSE SERPL-MCNC: 85 MG/DL
HBA1C MFR BLD HPLC: 5.9 %
POTASSIUM SERPL-SCNC: 4 MMOL/L
PROT SERPL-MCNC: 7.9 G/DL
SARS-COV-2 AB SERPL IA-ACNC: 91.7 U/ML
SARS-COV-2 AB SERPL QL IA: 30.5 INDEX
SODIUM SERPL-SCNC: 138 MMOL/L
TSH SERPL-ACNC: 1.33 UIU/ML
VIT B12 SERPL-MCNC: 692 PG/ML

## 2022-02-08 LAB
BASOPHILS # BLD AUTO: 0.07 K/UL
BASOPHILS NFR BLD AUTO: 0.8 %
EOSINOPHIL # BLD AUTO: 0.42 K/UL
EOSINOPHIL NFR BLD AUTO: 5 %
HCT VFR BLD CALC: 38.6 %
HGB BLD-MCNC: 12.2 G/DL
IMM GRANULOCYTES NFR BLD AUTO: 0.2 %
LYMPHOCYTES # BLD AUTO: 2.71 K/UL
LYMPHOCYTES NFR BLD AUTO: 32.5 %
MAN DIFF?: NORMAL
MCHC RBC-ENTMCNC: 30 PG
MCHC RBC-ENTMCNC: 31.6 GM/DL
MCV RBC AUTO: 94.8 FL
MONOCYTES # BLD AUTO: 0.67 K/UL
MONOCYTES NFR BLD AUTO: 8 %
NEUTROPHILS # BLD AUTO: 4.46 K/UL
NEUTROPHILS NFR BLD AUTO: 53.5 %
PLATELET # BLD AUTO: 273 K/UL
RBC # BLD: 4.07 M/UL
RBC # FLD: 13.7 %
WBC # FLD AUTO: 8.35 K/UL

## 2022-03-03 ENCOUNTER — TRANSCRIPTION ENCOUNTER (OUTPATIENT)
Age: 72
End: 2022-03-03

## 2022-03-03 ENCOUNTER — EMERGENCY (EMERGENCY)
Facility: HOSPITAL | Age: 72
LOS: 1 days | Discharge: HOME | End: 2022-03-03
Attending: EMERGENCY MEDICINE
Payer: MEDICARE

## 2022-03-03 VITALS
WEIGHT: 143.08 LBS | HEART RATE: 82 BPM | DIASTOLIC BLOOD PRESSURE: 88 MMHG | TEMPERATURE: 98 F | RESPIRATION RATE: 18 BRPM | HEIGHT: 65 IN | OXYGEN SATURATION: 100 % | SYSTOLIC BLOOD PRESSURE: 140 MMHG

## 2022-03-03 VITALS
OXYGEN SATURATION: 100 % | SYSTOLIC BLOOD PRESSURE: 147 MMHG | TEMPERATURE: 98 F | HEART RATE: 85 BPM | DIASTOLIC BLOOD PRESSURE: 80 MMHG | RESPIRATION RATE: 18 BRPM

## 2022-03-03 DIAGNOSIS — D18.02 HEMANGIOMA OF INTRACRANIAL STRUCTURES: Chronic | ICD-10-CM

## 2022-03-03 LAB
ALBUMIN SERPL ELPH-MCNC: 4.5 G/DL — SIGNIFICANT CHANGE UP (ref 3.3–5)
ALP SERPL-CCNC: 103 U/L — SIGNIFICANT CHANGE UP (ref 40–120)
ALT FLD-CCNC: 13 U/L — SIGNIFICANT CHANGE UP (ref 10–45)
ANION GAP SERPL CALC-SCNC: 12 MMOL/L — SIGNIFICANT CHANGE UP (ref 5–17)
AST SERPL-CCNC: 25 U/L — SIGNIFICANT CHANGE UP (ref 10–40)
BASOPHILS # BLD AUTO: 0.08 K/UL — SIGNIFICANT CHANGE UP (ref 0–0.2)
BASOPHILS NFR BLD AUTO: 1 % — SIGNIFICANT CHANGE UP (ref 0–2)
BILIRUB SERPL-MCNC: 0.4 MG/DL — SIGNIFICANT CHANGE UP (ref 0.2–1.2)
BUN SERPL-MCNC: 22 MG/DL — SIGNIFICANT CHANGE UP (ref 7–23)
CALCIUM SERPL-MCNC: 10.2 MG/DL — SIGNIFICANT CHANGE UP (ref 8.4–10.5)
CHLORIDE SERPL-SCNC: 101 MMOL/L — SIGNIFICANT CHANGE UP (ref 96–108)
CO2 SERPL-SCNC: 24 MMOL/L — SIGNIFICANT CHANGE UP (ref 22–31)
CREAT SERPL-MCNC: 0.99 MG/DL — SIGNIFICANT CHANGE UP (ref 0.5–1.3)
D DIMER BLD IA.RAPID-MCNC: 274 NG/ML DDU — HIGH
EGFR: 60 ML/MIN/1.73M2 — SIGNIFICANT CHANGE UP
EOSINOPHIL # BLD AUTO: 0.22 K/UL — SIGNIFICANT CHANGE UP (ref 0–0.5)
EOSINOPHIL NFR BLD AUTO: 2.8 % — SIGNIFICANT CHANGE UP (ref 0–6)
GLUCOSE SERPL-MCNC: 100 MG/DL — HIGH (ref 70–99)
HCT VFR BLD CALC: 37 % — SIGNIFICANT CHANGE UP (ref 34.5–45)
HGB BLD-MCNC: 12.3 G/DL — SIGNIFICANT CHANGE UP (ref 11.5–15.5)
IMM GRANULOCYTES NFR BLD AUTO: 0.1 % — SIGNIFICANT CHANGE UP (ref 0–1.5)
LYMPHOCYTES # BLD AUTO: 1.94 K/UL — SIGNIFICANT CHANGE UP (ref 1–3.3)
LYMPHOCYTES # BLD AUTO: 24.3 % — SIGNIFICANT CHANGE UP (ref 13–44)
MCHC RBC-ENTMCNC: 30.5 PG — SIGNIFICANT CHANGE UP (ref 27–34)
MCHC RBC-ENTMCNC: 33.2 GM/DL — SIGNIFICANT CHANGE UP (ref 32–36)
MCV RBC AUTO: 91.8 FL — SIGNIFICANT CHANGE UP (ref 80–100)
MONOCYTES # BLD AUTO: 0.45 K/UL — SIGNIFICANT CHANGE UP (ref 0–0.9)
MONOCYTES NFR BLD AUTO: 5.6 % — SIGNIFICANT CHANGE UP (ref 2–14)
NEUTROPHILS # BLD AUTO: 5.28 K/UL — SIGNIFICANT CHANGE UP (ref 1.8–7.4)
NEUTROPHILS NFR BLD AUTO: 66.2 % — SIGNIFICANT CHANGE UP (ref 43–77)
NRBC # BLD: 0 /100 WBCS — SIGNIFICANT CHANGE UP (ref 0–0)
NT-PROBNP SERPL-SCNC: 58 PG/ML — SIGNIFICANT CHANGE UP (ref 0–300)
PLATELET # BLD AUTO: 270 K/UL — SIGNIFICANT CHANGE UP (ref 150–400)
POTASSIUM SERPL-MCNC: 3.8 MMOL/L — SIGNIFICANT CHANGE UP (ref 3.5–5.3)
POTASSIUM SERPL-SCNC: 3.8 MMOL/L — SIGNIFICANT CHANGE UP (ref 3.5–5.3)
PROT SERPL-MCNC: 7.8 G/DL — SIGNIFICANT CHANGE UP (ref 6–8.3)
RBC # BLD: 4.03 M/UL — SIGNIFICANT CHANGE UP (ref 3.8–5.2)
RBC # FLD: 13.1 % — SIGNIFICANT CHANGE UP (ref 10.3–14.5)
SARS-COV-2 RNA SPEC QL NAA+PROBE: SIGNIFICANT CHANGE UP
SODIUM SERPL-SCNC: 137 MMOL/L — SIGNIFICANT CHANGE UP (ref 135–145)
TROPONIN T, HIGH SENSITIVITY RESULT: 6 NG/L — SIGNIFICANT CHANGE UP (ref 0–51)
WBC # BLD: 7.98 K/UL — SIGNIFICANT CHANGE UP (ref 3.8–10.5)
WBC # FLD AUTO: 7.98 K/UL — SIGNIFICANT CHANGE UP (ref 3.8–10.5)

## 2022-03-03 PROCEDURE — 84484 ASSAY OF TROPONIN QUANT: CPT

## 2022-03-03 PROCEDURE — U0003: CPT

## 2022-03-03 PROCEDURE — 85379 FIBRIN DEGRADATION QUANT: CPT

## 2022-03-03 PROCEDURE — 99285 EMERGENCY DEPT VISIT HI MDM: CPT | Mod: CS,GC

## 2022-03-03 PROCEDURE — 71045 X-RAY EXAM CHEST 1 VIEW: CPT | Mod: 26

## 2022-03-03 PROCEDURE — 71045 X-RAY EXAM CHEST 1 VIEW: CPT

## 2022-03-03 PROCEDURE — 93005 ELECTROCARDIOGRAM TRACING: CPT

## 2022-03-03 PROCEDURE — 36415 COLL VENOUS BLD VENIPUNCTURE: CPT

## 2022-03-03 PROCEDURE — 80053 COMPREHEN METABOLIC PANEL: CPT

## 2022-03-03 PROCEDURE — 99285 EMERGENCY DEPT VISIT HI MDM: CPT | Mod: 25

## 2022-03-03 PROCEDURE — 83880 ASSAY OF NATRIURETIC PEPTIDE: CPT

## 2022-03-03 PROCEDURE — 85025 COMPLETE CBC W/AUTO DIFF WBC: CPT

## 2022-03-03 PROCEDURE — 93010 ELECTROCARDIOGRAM REPORT: CPT

## 2022-03-03 PROCEDURE — U0005: CPT

## 2022-03-03 NOTE — ED PROVIDER NOTE - PATIENT PORTAL LINK FT
You can access the FollowMyHealth Patient Portal offered by [facility] by registering at the following website: [email or web address]. By joining [employer]’s FollowMyHealth portal, you will also be able to view your health information using other applications (apps) compatible with our system.

## 2022-03-03 NOTE — ED PROVIDER NOTE - CLINICAL SUMMARY MEDICAL DECISION MAKING FREE TEXT BOX
Patient is a 71y F PMHx unstable angina, cerebral meningioma s/p removal, presenting today with >1 year intermittent shortness of breath. R/o ACS cause. Vitals normal in ED. WIll get d-dimer, r/o PE. Likely discharge with cardiology f/u. Patient is a 71y F PMHx unstable angina, cerebral meningioma s/p removal, presenting today with >1 year intermittent shortness of breath. R/o ACS cause. Vitals normal in ED. WIll get d-dimer, r/o PE. Likely discharge with cardiology f/u.  [place] 71 f with ho unstable angina w intermitt sob x 1 year rthis am awoke with sob no cp no fever no cough not vaccinated for covid, seen at urgent care w reported hypoxia to 87 on ra - but 100 on ra by ems and in ed sats  - ekg nonischemic  0 will send labs biomarkers, dimer if all neg dc home with cards and pulm fu

## 2022-03-03 NOTE — ED PROVIDER NOTE - OBJECTIVE STATEMENT
Patient is a 71y F PMHx unstable angina, cerebral meningioma s/p removal, presenting today with >1 year intermittent shortness of breath. Patient went to urgent care today who told her to come to the ED. Patient states she has been hospitalized in the past for similar complaint with negative work-up. Patient had been diagnosed with unstable angina with abnormal nuclear stress test 2017, sent home on ASA and metoprolol. Currently states she is not on any medication. Denies CP, leg swelling, palpitations, arm pain, pleuritic pain.

## 2022-03-03 NOTE — ED ADULT NURSE NOTE - OBJECTIVE STATEMENT
72 y/o female presents to ED for an episode of difficulty breathing. Pt a&ox4, endorses doing the dishes when she had an episode of difficulty breathing. Pt has had an episode like this ~1 year ago and was worked up for it and negative. Pt went to urgent care today and was 86% on RA. Upon EMS arrival, pt was 100% on RA. Upon arrival to ED, pt maintaining airway, denies difficulty breathing, and is 100% on RA. Pt denies PMH, denies any other associated symptoms. Denies fever, chills, sob at this time, cough, n/v/d. MD at bedside for eval.

## 2022-03-03 NOTE — ED PROVIDER NOTE - NSFOLLOWUPINSTRUCTIONS_ED_ALL_ED_FT
Please follow up with a pulmonologist and your cardiologist. Please bring your attached results.     Please see general info below regarding shortness of breath.     Shortness of breath    Shortness of breath (dyspnea) means you have trouble breathing and could indicate a medical problem. Causes include lung disease, heart disease, low amount of red blood cells (anemia), poor physical fitness, being overweight, smoking, etc. Your health care provider today may not be able to find a cause for your shortness of breath after your exam. In this case, it is important to have a follow-up exam with your primary care physician as instructed. If medicines were prescribed, take them as directed for the full length of time directed. Refrain from tobacco products.    SEEK IMMEDIATE MEDICAL CARE IF YOU HAVE ANY OF THE FOLLOWING SYMPTOMS: worsening shortness of breath, chest pain, back pain, abdominal pain, fever, coughing up blood, lightheadedness/dizziness.

## 2022-03-03 NOTE — ED PROVIDER NOTE - NSFOLLOWUPCLINICS_GEN_ALL_ED_FT
[facility]  Cardiology  [address] [place] [id number]  Phone: [phone]  Fax:     [facility]  Pulmonology  [address] [place] [id number]  Phone: [phone]  Fax:

## 2022-03-03 NOTE — ED PROVIDER NOTE - PHYSICAL EXAMINATION
GENERAL: no acute distress, non-toxic appearing  HEAD: normocephalic, atraumatic  HEENT: PERRLA, EOMI, normal conjunctiva  CARDIAC: regular rate and rhythm  PULM: clear to ascultation bilaterally  GI: abdomen nondistended, soft, nontender  NEURO: alert and oriented x 3, normal speech, no gross neurologic deficit  MSK: no visible deformities, no peripheral edema, calf tenderness/redness/swelling  SKIN: no visible rashes, dry, well-perfused  PSYCH: appropriate mood and affect

## 2022-03-03 NOTE — ED PROVIDER NOTE - PROGRESS NOTE DETAILS
Per chart review patient with cardiac w/u 2017. Unstable angina, d/c ASA and metoprolol. Patient not compliant with meds at this time. Poor historian on interview. Multiple ED visits since for similar concerns as today. Joseph Frankel PGY3: Work up non actionable. Dimer mildly elevated but age adjusted it is negative and low suspicion for PE to begin with. Will dc with pulm and cardiology with follow up. Discussed plan and return precautions with patient who understands and agrees. All questions answered.

## 2022-03-07 ENCOUNTER — APPOINTMENT (OUTPATIENT)
Dept: INTERNAL MEDICINE | Facility: CLINIC | Age: 72
End: 2022-03-07
Payer: MEDICARE

## 2022-03-07 VITALS
TEMPERATURE: 97.5 F | SYSTOLIC BLOOD PRESSURE: 151 MMHG | OXYGEN SATURATION: 97 % | WEIGHT: 134 LBS | RESPIRATION RATE: 12 BRPM | HEART RATE: 92 BPM | HEIGHT: 60 IN | DIASTOLIC BLOOD PRESSURE: 87 MMHG | BODY MASS INDEX: 26.31 KG/M2

## 2022-03-07 VITALS — DIASTOLIC BLOOD PRESSURE: 78 MMHG | SYSTOLIC BLOOD PRESSURE: 126 MMHG

## 2022-03-07 LAB
CHOLEST SERPL-MCNC: 228 MG/DL
HDLC SERPL-MCNC: 52 MG/DL
LDLC SERPL CALC-MCNC: 147 MG/DL
NONHDLC SERPL-MCNC: 176 MG/DL
TRIGL SERPL-MCNC: 146 MG/DL

## 2022-03-07 PROCEDURE — 99214 OFFICE O/P EST MOD 30 MIN: CPT

## 2022-03-07 NOTE — ASSESSMENT
[FreeTextEntry1] : \par \par Lab results reviewed and discussed with patient\par \par elevated cholesterol \par discussed importance of following a low cholesterol/low fat diet\par recheck lipids in 3 months\par \par HgA1c is 5.9-High risk for diabetes. \par discussed importance of following a low sugar/low carb diet\par we'll recheck fasting glucose and HgA1c in 3 months\par \par COVID ab is positive\par

## 2022-03-07 NOTE — PHYSICAL EXAM
[No Acute Distress] : no acute distress [Well Nourished] : well nourished [Normal Sclera/Conjunctiva] : normal sclera/conjunctiva [Normal Outer Ear/Nose] : the outer ears and nose were normal in appearance [Normal Oropharynx] : the oropharynx was normal [No JVD] : no jugular venous distention [No Lymphadenopathy] : no lymphadenopathy [Supple] : supple [No Respiratory Distress] : no respiratory distress  [No Accessory Muscle Use] : no accessory muscle use [Clear to Auscultation] : lungs were clear to auscultation bilaterally [Normal Rate] : normal rate  [Regular Rhythm] : with a regular rhythm [Normal S1, S2] : normal S1 and S2 [No Murmur] : no murmur heard [No Edema] : there was no peripheral edema [Soft] : abdomen soft [Non Tender] : non-tender [Non-distended] : non-distended [Normal Anterior Cervical Nodes] : no anterior cervical lymphadenopathy [No CVA Tenderness] : no CVA  tenderness [No Joint Swelling] : no joint swelling [No Rash] : no rash [Coordination Grossly Intact] : coordination grossly intact [No Focal Deficits] : no focal deficits [Normal Gait] : normal gait [Normal Affect] : the affect was normal [Alert and Oriented x3] : oriented to person, place, and time [Normal Insight/Judgement] : insight and judgment were intact

## 2022-04-11 ENCOUNTER — APPOINTMENT (OUTPATIENT)
Dept: CARDIOLOGY | Facility: CLINIC | Age: 72
End: 2022-04-11

## 2022-04-11 ENCOUNTER — APPOINTMENT (OUTPATIENT)
Dept: PULMONOLOGY | Facility: CLINIC | Age: 72
End: 2022-04-11

## 2022-05-04 ENCOUNTER — APPOINTMENT (OUTPATIENT)
Dept: CARDIOLOGY | Facility: CLINIC | Age: 72
End: 2022-05-04
Payer: MEDICARE

## 2022-05-04 ENCOUNTER — NON-APPOINTMENT (OUTPATIENT)
Age: 72
End: 2022-05-04

## 2022-05-04 ENCOUNTER — APPOINTMENT (OUTPATIENT)
Dept: PULMONOLOGY | Facility: CLINIC | Age: 72
End: 2022-05-04
Payer: MEDICARE

## 2022-05-04 VITALS
SYSTOLIC BLOOD PRESSURE: 155 MMHG | RESPIRATION RATE: 12 BRPM | HEART RATE: 84 BPM | BODY MASS INDEX: 25.72 KG/M2 | OXYGEN SATURATION: 98 % | DIASTOLIC BLOOD PRESSURE: 83 MMHG | HEIGHT: 60 IN | WEIGHT: 131 LBS

## 2022-05-04 DIAGNOSIS — F45.8 OTHER SOMATOFORM DISORDERS: ICD-10-CM

## 2022-05-04 PROCEDURE — 93000 ELECTROCARDIOGRAM COMPLETE: CPT

## 2022-05-04 PROCEDURE — 94726 PLETHYSMOGRAPHY LUNG VOLUMES: CPT

## 2022-05-04 PROCEDURE — 99203 OFFICE O/P NEW LOW 30 MIN: CPT | Mod: 25

## 2022-05-04 PROCEDURE — 94060 EVALUATION OF WHEEZING: CPT | Mod: 59

## 2022-05-04 PROCEDURE — 94729 DIFFUSING CAPACITY: CPT

## 2022-05-04 PROCEDURE — 94621 CARDIOPULM EXERCISE TESTING: CPT

## 2022-05-04 NOTE — ASSESSMENT
[FreeTextEntry1] : In summary the patient is a 71-year-old obese female with past medical history significant for scoliosis, high cholesterol moderate aortic stenosis who is referred today for pulmonary consult.  The patient's physical exam is significant for good air entry bilaterally.  The patient underwent a pulmonary function test which revealed normal lung volumes.\par \par The patient underwent a cardiopulmonary stress test which revealed an oxygen saturation at rest of 99% which decreased to 75 at an exercise.  The patient also attained a peak VO2 of 11.4 or 3.26 METS.  I had a lengthy conversation with the patient and Dr. Curry the patient will require in my opinion a stress echo to evaluate worsening of her aortic stenosis.  The patient is instructed to continue current therapy and to follow-up in 1 month

## 2022-05-04 NOTE — REVIEW OF SYSTEMS
[SOB] : shortness of breath [Dyspnea on exertion] : dyspnea during exertion [Chest Discomfort] : no chest discomfort [Lower Ext Edema] : no extremity edema [Leg Claudication] : no intermittent leg claudication [Palpitations] : no palpitations [Orthopnea] : no orthopnea [Negative] : Heme/Lymph

## 2022-05-04 NOTE — DISCUSSION/SUMMARY
[FreeTextEntry1] : The patient is a 71-year-old female AS with recent onset SOB. \par #1 CV- normal ECG, no LVH seen, ECHO and exercise stress ordered\par #2 Pulm- evaluation pending\par \par Addendum: PFT ok, CPET with desaturation to 73%\par Proceed with exercise stress ECHO as last echo was moderate AS and no change on exam or LVH on ECG.

## 2022-05-04 NOTE — PHYSICAL EXAM

## 2022-05-04 NOTE — HISTORY OF PRESENT ILLNESS
[FreeTextEntry1] : Belem is a 71-year-old female who presents with SOB. She noticed over the last several weeks that she gets more SOB with normal housework. No CP, palpitations, lightheadedness or dizziness.

## 2022-05-04 NOTE — HISTORY OF PRESENT ILLNESS
[Never] : never [TextBox_4] : Patient is a 71 year old female with no significant medical history presents for evaluation of shortness of breath\par \par \par Patient reports that for the last 3-4 months she has been having increased episodes of shortness of breath and the feeling of not able to get enough air into her lunges. Denies any chest pain, palpitations, cough, or hemoptysis + chest tightness \par \par Of note she is exposed to second hand smoke at home.  The patient's echo reveals moderate aortic stenosis.  Of note the patient is followed by Dr. Curry

## 2022-05-04 NOTE — REASON FOR VISIT
[Shortness of Breath] : shortness of breath [Consultation] : a consultation [Cough] : cough [TextBox_13] : Dr. Crowe

## 2022-05-17 ENCOUNTER — APPOINTMENT (OUTPATIENT)
Dept: CARDIOLOGY | Facility: CLINIC | Age: 72
End: 2022-05-17

## 2022-05-31 ENCOUNTER — APPOINTMENT (OUTPATIENT)
Dept: CARDIOLOGY | Facility: CLINIC | Age: 72
End: 2022-05-31
Payer: MEDICARE

## 2022-05-31 PROCEDURE — 93351 STRESS TTE COMPLETE: CPT

## 2022-06-07 ENCOUNTER — APPOINTMENT (OUTPATIENT)
Dept: INTERNAL MEDICINE | Facility: CLINIC | Age: 72
End: 2022-06-07
Payer: MEDICARE

## 2022-06-07 VITALS
DIASTOLIC BLOOD PRESSURE: 83 MMHG | OXYGEN SATURATION: 100 % | WEIGHT: 135 LBS | SYSTOLIC BLOOD PRESSURE: 133 MMHG | HEART RATE: 85 BPM | TEMPERATURE: 97.1 F | RESPIRATION RATE: 12 BRPM | HEIGHT: 60 IN | BODY MASS INDEX: 26.5 KG/M2

## 2022-06-07 PROCEDURE — 99214 OFFICE O/P EST MOD 30 MIN: CPT

## 2022-06-07 NOTE — PHYSICAL EXAM
[No Acute Distress] : no acute distress [Well-Appearing] : well-appearing [Normal Sclera/Conjunctiva] : normal sclera/conjunctiva [EOMI] : extraocular movements intact [Normal Outer Ear/Nose] : the outer ears and nose were normal in appearance [Normal Oropharynx] : the oropharynx was normal [No JVD] : no jugular venous distention [No Lymphadenopathy] : no lymphadenopathy [Supple] : supple [Thyroid Normal, No Nodules] : the thyroid was normal and there were no nodules present [No Respiratory Distress] : no respiratory distress  [No Accessory Muscle Use] : no accessory muscle use [Clear to Auscultation] : lungs were clear to auscultation bilaterally [Normal Rate] : normal rate  [Regular Rhythm] : with a regular rhythm [Normal S1, S2] : normal S1 and S2 [No Murmur] : no murmur heard [No Carotid Bruits] : no carotid bruits [No Varicosities] : no varicosities [Pedal Pulses Present] : the pedal pulses are present [No Edema] : there was no peripheral edema [No Extremity Clubbing/Cyanosis] : no extremity clubbing/cyanosis [Soft] : abdomen soft [Non Tender] : non-tender [Non-distended] : non-distended [No Masses] : no abdominal mass palpated [No HSM] : no HSM [Normal Bowel Sounds] : normal bowel sounds [Normal Posterior Cervical Nodes] : no posterior cervical lymphadenopathy [Normal Anterior Cervical Nodes] : no anterior cervical lymphadenopathy [No CVA Tenderness] : no CVA  tenderness [No Spinal Tenderness] : no spinal tenderness [No Joint Swelling] : no joint swelling [Grossly Normal Strength/Tone] : grossly normal strength/tone [No Rash] : no rash [Coordination Grossly Intact] : coordination grossly intact [No Focal Deficits] : no focal deficits [Normal Gait] : normal gait [Normal Affect] : the affect was normal [Normal Insight/Judgement] : insight and judgment were intact

## 2022-06-08 ENCOUNTER — NON-APPOINTMENT (OUTPATIENT)
Age: 72
End: 2022-06-08

## 2022-06-08 ENCOUNTER — APPOINTMENT (OUTPATIENT)
Dept: PULMONOLOGY | Facility: CLINIC | Age: 72
End: 2022-06-08
Payer: MEDICARE

## 2022-06-08 ENCOUNTER — APPOINTMENT (OUTPATIENT)
Dept: CARDIOLOGY | Facility: CLINIC | Age: 72
End: 2022-06-08

## 2022-06-08 ENCOUNTER — APPOINTMENT (OUTPATIENT)
Dept: CARDIOLOGY | Facility: CLINIC | Age: 72
End: 2022-06-08
Payer: MEDICARE

## 2022-06-08 VITALS
DIASTOLIC BLOOD PRESSURE: 78 MMHG | HEIGHT: 60 IN | SYSTOLIC BLOOD PRESSURE: 126 MMHG | HEART RATE: 83 BPM | TEMPERATURE: 97.7 F | WEIGHT: 134 LBS | BODY MASS INDEX: 26.31 KG/M2 | OXYGEN SATURATION: 100 %

## 2022-06-08 PROCEDURE — 99214 OFFICE O/P EST MOD 30 MIN: CPT

## 2022-06-08 PROCEDURE — 93000 ELECTROCARDIOGRAM COMPLETE: CPT

## 2022-06-08 PROCEDURE — 99213 OFFICE O/P EST LOW 20 MIN: CPT

## 2022-06-08 NOTE — HISTORY OF PRESENT ILLNESS
[FreeTextEntry1] : Belem continues to be SOB but not consistently . No CP, palpitations or dizziness. Felt fine with stress echo.

## 2022-06-08 NOTE — DISCUSSION/SUMMARY
[FreeTextEntry1] : The patient is a 71-year-old female AS with recent onset SOB but negative stress echo. \par #1 CV- normal ECG, no LVH seen, ECHO no significant AS noted, no increase in pulmonary pressures, no ischemia\par #2 Pulm-  CPET with desaturation to 73%, trial bronchodilators, if no improvement then R/L cath.\par

## 2022-06-08 NOTE — ASSESSMENT
[FreeTextEntry1] : In summary the patient is a 71-year-old female with past medical history significant for elevated hemoglobin A1c, mild aortic stenosis who presents for pulmonary follow-up regarding her shortness of breath and dyspnea on exertion.  Her physical exam is within normal limits.  I had a lengthy discussion with the patient in cardiology.  Her symptoms remained undiagnosed.  The patient had a near normal stress echo with an abnormal cardiopulmonary exercise test.  The patient is now being started on Trelegy to see if she responds.  If her symptoms do not resolve postbronchodilator therapy the patient will require a right and left heart catheterization.  The patient is instructed to follow-up in 2 weeks

## 2022-06-08 NOTE — REVIEW OF SYSTEMS
[SOB] : shortness of breath [Dyspnea on exertion] : dyspnea during exertion [Negative] : Heme/Lymph [Chest Discomfort] : no chest discomfort [Lower Ext Edema] : no extremity edema [Leg Claudication] : no intermittent leg claudication [Palpitations] : no palpitations [Orthopnea] : no orthopnea

## 2022-06-08 NOTE — PHYSICAL EXAM

## 2022-06-08 NOTE — HISTORY OF PRESENT ILLNESS
[Never] : never [TextBox_4] : Patient is a 71-year-old female with past medical history significant for anxiety, prediabetes, mild aortic stenosis who presents today for pulmonary follow-up.  The patient underwent a stress echo which revealed mild stenosis and unfortunately this does not further diagnose her hypoxemia.  The patient underwent a cardiopulmonary stress test and desaturated during exercise.  She presents today continuing to complain about occasional shortness of breath and dyspnea on exertion.  She is also concerned that her hemoglobin A1c was 6.2

## 2022-06-12 NOTE — HISTORY OF PRESENT ILLNESS
[de-identified] : Patient is a 71 year old female with history of high cholesterol presents with c/o shortness of breath \par \par She reports for the last 6 months she has been having episodes of shortness of breath and the feeling of not being able to get enough air into her lungs. Episodes are random she can be sitting still and start feeling shortness of breath. Denies any anxious feelings, chest pain, palpitations N/V or abdominal pain \par She was seen by cardiology and pulmonology in May.  Had normal stress echo  May 31, 2022\par \par Of note she reports that she is exposed to second hand smoke at home \par

## 2022-06-12 NOTE — ASSESSMENT
[FreeTextEntry1] : Follow-up \par \par \par Shortness of breath- ? possible some component of anxiety\par saw pulmonary and cardiology\par normal stress echo\par CXR done in March - normal\par f/u with Cardiology and pulmonary\par \par HLD\par Low sodium, low cholesterol diet/ increased physical activity \par we'll check lipids\par \par High risk diabetes\par reinforced Low carb, low sugar diet/ increased physical activity\par we'll check glucose andf HgA1c today\par \par Labs ordered pt to follow-up in 1 week for results

## 2022-06-27 LAB
ALBUMIN SERPL ELPH-MCNC: 4.4 G/DL
ALP BLD-CCNC: 110 U/L
ALT SERPL-CCNC: 13 U/L
ANION GAP SERPL CALC-SCNC: 16 MMOL/L
AST SERPL-CCNC: 29 U/L
BILIRUB SERPL-MCNC: 0.4 MG/DL
BUN SERPL-MCNC: 21 MG/DL
CALCIUM SERPL-MCNC: 10.2 MG/DL
CHLORIDE SERPL-SCNC: 100 MMOL/L
CHOLEST SERPL-MCNC: 235 MG/DL
CO2 SERPL-SCNC: 22 MMOL/L
CREAT SERPL-MCNC: 1.11 MG/DL
EGFR: 52 ML/MIN/1.73M2
ESTIMATED AVERAGE GLUCOSE: 131 MG/DL
GLUCOSE SERPL-MCNC: 96 MG/DL
HBA1C MFR BLD HPLC: 6.2 %
HDLC SERPL-MCNC: 57 MG/DL
LDLC SERPL CALC-MCNC: 153 MG/DL
NONHDLC SERPL-MCNC: 178 MG/DL
POTASSIUM SERPL-SCNC: 4.7 MMOL/L
PROT SERPL-MCNC: 8.1 G/DL
SODIUM SERPL-SCNC: 138 MMOL/L
TRIGL SERPL-MCNC: 127 MG/DL

## 2022-06-29 LAB
BASOPHILS # BLD AUTO: 0.08 K/UL
BASOPHILS NFR BLD AUTO: 0.9 %
EOSINOPHIL # BLD AUTO: 0.31 K/UL
EOSINOPHIL NFR BLD AUTO: 3.6 %
HCT VFR BLD CALC: 38 %
HGB BLD-MCNC: 12.5 G/DL
IMM GRANULOCYTES NFR BLD AUTO: 0.2 %
LYMPHOCYTES # BLD AUTO: 2.16 K/UL
LYMPHOCYTES NFR BLD AUTO: 25.1 %
MAN DIFF?: NORMAL
MCHC RBC-ENTMCNC: 30.2 PG
MCHC RBC-ENTMCNC: 32.9 GM/DL
MCV RBC AUTO: 91.8 FL
MONOCYTES # BLD AUTO: 0.66 K/UL
MONOCYTES NFR BLD AUTO: 7.7 %
NEUTROPHILS # BLD AUTO: 5.39 K/UL
NEUTROPHILS NFR BLD AUTO: 62.5 %
PLATELET # BLD AUTO: 288 K/UL
RBC # BLD: 4.14 M/UL
RBC # FLD: 12.9 %
WBC # FLD AUTO: 8.62 K/UL

## 2022-09-12 ENCOUNTER — APPOINTMENT (OUTPATIENT)
Dept: PULMONOLOGY | Facility: CLINIC | Age: 72
End: 2022-09-12

## 2022-09-12 VITALS
TEMPERATURE: 97.5 F | BODY MASS INDEX: 25.58 KG/M2 | RESPIRATION RATE: 12 BRPM | SYSTOLIC BLOOD PRESSURE: 134 MMHG | HEART RATE: 87 BPM | OXYGEN SATURATION: 99 % | DIASTOLIC BLOOD PRESSURE: 75 MMHG | WEIGHT: 131 LBS

## 2022-09-12 DIAGNOSIS — R73.09 OTHER ABNORMAL GLUCOSE: ICD-10-CM

## 2022-09-12 DIAGNOSIS — R06.00 DYSPNEA, UNSPECIFIED: ICD-10-CM

## 2022-09-12 DIAGNOSIS — I35.0 NONRHEUMATIC AORTIC (VALVE) STENOSIS: ICD-10-CM

## 2022-09-12 DIAGNOSIS — Z82.49 FAMILY HISTORY OF ISCHEMIC HEART DISEASE AND OTHER DISEASES OF THE CIRCULATORY SYSTEM: ICD-10-CM

## 2022-09-12 DIAGNOSIS — Z78.9 OTHER SPECIFIED HEALTH STATUS: ICD-10-CM

## 2022-09-12 DIAGNOSIS — R06.02 SHORTNESS OF BREATH: ICD-10-CM

## 2022-09-12 PROCEDURE — 99214 OFFICE O/P EST MOD 30 MIN: CPT

## 2022-09-12 NOTE — HISTORY OF PRESENT ILLNESS
[Never] : never [TextBox_4] : Patient is a 71-year-old female with past medical history significant for anxiety aortic stenosis high cholesterol diabetes who presents today for follow-up.  The patient is complaining of shortness of breath.  She states that she is unable to breathe when she wears a mask for COVID.  She currently denies fevers chills chest pain weight loss or hemoptysis

## 2022-09-12 NOTE — ASSESSMENT
[FreeTextEntry1] : In summary patient is a 71-year-old female with anxiety depression diabetes hypertension high cholesterol who presents for pulmonary follow-up.  The patient's physical exam is within normal limits.  I had a lengthy review with the patient regarding her recent blood work.  I have recommended that she follow-up with her PCP.  I have also recommended that she diet and avoid fatty foods and to begin exercise.  The patient is instructed to follow-up on a as needed basis

## 2022-09-21 NOTE — ED PROVIDER NOTE - ST/T WAVE
Quality 111:Pneumonia Vaccination Status For Older Adults: Pneumococcal Vaccination Previously Received Detail Level: Detailed Quality 110: Preventive Care And Screening: Influenza Immunization: Influenza Immunization previously received during influenza season no overt acute ischemic changes

## 2022-10-04 ENCOUNTER — APPOINTMENT (OUTPATIENT)
Dept: INTERNAL MEDICINE | Facility: CLINIC | Age: 72
End: 2022-10-04

## 2022-10-04 VITALS
WEIGHT: 132 LBS | HEART RATE: 88 BPM | HEIGHT: 60 IN | SYSTOLIC BLOOD PRESSURE: 116 MMHG | BODY MASS INDEX: 25.91 KG/M2 | RESPIRATION RATE: 12 BRPM | OXYGEN SATURATION: 95 % | DIASTOLIC BLOOD PRESSURE: 74 MMHG

## 2022-10-04 PROCEDURE — 99214 OFFICE O/P EST MOD 30 MIN: CPT

## 2022-10-12 ENCOUNTER — APPOINTMENT (OUTPATIENT)
Dept: CARDIOLOGY | Facility: CLINIC | Age: 72
End: 2022-10-12

## 2022-10-18 LAB
ALBUMIN SERPL ELPH-MCNC: 4.6 G/DL
ALP BLD-CCNC: 108 U/L
ALT SERPL-CCNC: 14 U/L
ANION GAP SERPL CALC-SCNC: 14 MMOL/L
AST SERPL-CCNC: 30 U/L
BILIRUB SERPL-MCNC: 0.3 MG/DL
BUN SERPL-MCNC: 28 MG/DL
CALCIUM SERPL-MCNC: 10 MG/DL
CHLORIDE SERPL-SCNC: 99 MMOL/L
CHOLEST SERPL-MCNC: 244 MG/DL
CO2 SERPL-SCNC: 24 MMOL/L
CREAT SERPL-MCNC: 1.18 MG/DL
EGFR: 49 ML/MIN/1.73M2
ESTIMATED AVERAGE GLUCOSE: 126 MG/DL
GLUCOSE SERPL-MCNC: 103 MG/DL
HBA1C MFR BLD HPLC: 6 %
HDLC SERPL-MCNC: 54 MG/DL
LDLC SERPL CALC-MCNC: 147 MG/DL
NONHDLC SERPL-MCNC: 190 MG/DL
POTASSIUM SERPL-SCNC: 4.3 MMOL/L
PROT SERPL-MCNC: 8.4 G/DL
SODIUM SERPL-SCNC: 138 MMOL/L
TRIGL SERPL-MCNC: 216 MG/DL

## 2022-10-18 NOTE — HISTORY OF PRESENT ILLNESS
[de-identified] : Patient is a 71 year old female with history of high cholesterol, high risk for diabetes, presents for follow up visit\par \par She c/o pain in the back of the neck that started about 3 weeks ago Says pain goes down the back\par Denies numbness / tingling to the upper extremities, head\par Denies any trauma to the area\par \par \par

## 2022-10-18 NOTE — PHYSICAL EXAM
[No Acute Distress] : no acute distress [Well Nourished] : well nourished [Normal Sclera/Conjunctiva] : normal sclera/conjunctiva [Normal Outer Ear/Nose] : the outer ears and nose were normal in appearance [Normal Oropharynx] : the oropharynx was normal [No JVD] : no jugular venous distention [No Lymphadenopathy] : no lymphadenopathy [Supple] : supple [No Respiratory Distress] : no respiratory distress  [No Accessory Muscle Use] : no accessory muscle use [Clear to Auscultation] : lungs were clear to auscultation bilaterally [Normal Rate] : normal rate  [Regular Rhythm] : with a regular rhythm [Normal S1, S2] : normal S1 and S2 [No Murmur] : no murmur heard [No Edema] : there was no peripheral edema [Soft] : abdomen soft [Non Tender] : non-tender [Non-distended] : non-distended [Normal Anterior Cervical Nodes] : no anterior cervical lymphadenopathy [No CVA Tenderness] : no CVA  tenderness [No Rash] : no rash [Coordination Grossly Intact] : coordination grossly intact [No Focal Deficits] : no focal deficits [Normal Gait] : normal gait [Normal Affect] : the affect was normal [Alert and Oriented x3] : oriented to person, place, and time [Normal Insight/Judgement] : insight and judgment were intact [de-identified] : ROM are within normal limits and without pain at the C-spine. There is some tenderness in the para spinal muscles on palpation at the area of C6-T1

## 2022-10-18 NOTE — ASSESSMENT
[FreeTextEntry1] : \par Cervical pain\par Motrin prn\par referred for PT\par \par HLD\par cont low cholesterol diet/ increased physical activity \par we'll check lipids today\par \par High risk diabetes\par reinforced Low carb, low sugar diet/ increased physical activity\par we'll check glucose andf HgA1c today\par \par blood work ordered\par \par follow up in one week for lab results\par

## 2022-11-08 ENCOUNTER — EMERGENCY (EMERGENCY)
Facility: HOSPITAL | Age: 72
LOS: 1 days | Discharge: HOME | End: 2022-11-08
Attending: STUDENT IN AN ORGANIZED HEALTH CARE EDUCATION/TRAINING PROGRAM
Payer: MEDICARE

## 2022-11-08 VITALS
OXYGEN SATURATION: 98 % | WEIGHT: 139.99 LBS | RESPIRATION RATE: 18 BRPM | HEIGHT: 61 IN | TEMPERATURE: 98 F | SYSTOLIC BLOOD PRESSURE: 176 MMHG | DIASTOLIC BLOOD PRESSURE: 102 MMHG | HEART RATE: 87 BPM

## 2022-11-08 DIAGNOSIS — D18.02 HEMANGIOMA OF INTRACRANIAL STRUCTURES: Chronic | ICD-10-CM

## 2022-11-08 LAB
ALBUMIN SERPL ELPH-MCNC: 4.7 G/DL — SIGNIFICANT CHANGE UP (ref 3.3–5)
ALP SERPL-CCNC: 112 U/L — SIGNIFICANT CHANGE UP (ref 40–120)
ALT FLD-CCNC: 12 U/L — SIGNIFICANT CHANGE UP (ref 10–45)
ANION GAP SERPL CALC-SCNC: 13 MMOL/L — SIGNIFICANT CHANGE UP (ref 5–17)
APTT BLD: 24.9 SEC — LOW (ref 27.5–35.5)
AST SERPL-CCNC: 23 U/L — SIGNIFICANT CHANGE UP (ref 10–40)
BASOPHILS # BLD AUTO: 0.03 K/UL — SIGNIFICANT CHANGE UP (ref 0–0.2)
BASOPHILS NFR BLD AUTO: 0.4 % — SIGNIFICANT CHANGE UP (ref 0–2)
BILIRUB SERPL-MCNC: 0.5 MG/DL — SIGNIFICANT CHANGE UP (ref 0.2–1.2)
BUN SERPL-MCNC: 18 MG/DL — SIGNIFICANT CHANGE UP (ref 7–23)
CALCIUM SERPL-MCNC: 9.8 MG/DL — SIGNIFICANT CHANGE UP (ref 8.4–10.5)
CHLORIDE SERPL-SCNC: 96 MMOL/L — SIGNIFICANT CHANGE UP (ref 96–108)
CO2 SERPL-SCNC: 25 MMOL/L — SIGNIFICANT CHANGE UP (ref 22–31)
CREAT SERPL-MCNC: 0.91 MG/DL — SIGNIFICANT CHANGE UP (ref 0.5–1.3)
EGFR: 66 ML/MIN/1.73M2 — SIGNIFICANT CHANGE UP
EOSINOPHIL # BLD AUTO: 0.06 K/UL — SIGNIFICANT CHANGE UP (ref 0–0.5)
EOSINOPHIL NFR BLD AUTO: 0.7 % — SIGNIFICANT CHANGE UP (ref 0–6)
GLUCOSE SERPL-MCNC: 140 MG/DL — HIGH (ref 70–99)
HCT VFR BLD CALC: 39.2 % — SIGNIFICANT CHANGE UP (ref 34.5–45)
HGB BLD-MCNC: 13 G/DL — SIGNIFICANT CHANGE UP (ref 11.5–15.5)
IMM GRANULOCYTES NFR BLD AUTO: 0.2 % — SIGNIFICANT CHANGE UP (ref 0–0.9)
INR BLD: 1.01 RATIO — SIGNIFICANT CHANGE UP (ref 0.88–1.16)
LYMPHOCYTES # BLD AUTO: 1.25 K/UL — SIGNIFICANT CHANGE UP (ref 1–3.3)
LYMPHOCYTES # BLD AUTO: 14.9 % — SIGNIFICANT CHANGE UP (ref 13–44)
MCHC RBC-ENTMCNC: 30.1 PG — SIGNIFICANT CHANGE UP (ref 27–34)
MCHC RBC-ENTMCNC: 33.2 GM/DL — SIGNIFICANT CHANGE UP (ref 32–36)
MCV RBC AUTO: 90.7 FL — SIGNIFICANT CHANGE UP (ref 80–100)
MONOCYTES # BLD AUTO: 0.46 K/UL — SIGNIFICANT CHANGE UP (ref 0–0.9)
MONOCYTES NFR BLD AUTO: 5.5 % — SIGNIFICANT CHANGE UP (ref 2–14)
NEUTROPHILS # BLD AUTO: 6.58 K/UL — SIGNIFICANT CHANGE UP (ref 1.8–7.4)
NEUTROPHILS NFR BLD AUTO: 78.3 % — HIGH (ref 43–77)
NRBC # BLD: 0 /100 WBCS — SIGNIFICANT CHANGE UP (ref 0–0)
PLATELET # BLD AUTO: 278 K/UL — SIGNIFICANT CHANGE UP (ref 150–400)
POTASSIUM SERPL-MCNC: 3.6 MMOL/L — SIGNIFICANT CHANGE UP (ref 3.5–5.3)
POTASSIUM SERPL-SCNC: 3.6 MMOL/L — SIGNIFICANT CHANGE UP (ref 3.5–5.3)
PROT SERPL-MCNC: 8.2 G/DL — SIGNIFICANT CHANGE UP (ref 6–8.3)
PROTHROM AB SERPL-ACNC: 11.6 SEC — SIGNIFICANT CHANGE UP (ref 10.5–13.4)
RBC # BLD: 4.32 M/UL — SIGNIFICANT CHANGE UP (ref 3.8–5.2)
RBC # FLD: 12.5 % — SIGNIFICANT CHANGE UP (ref 10.3–14.5)
SODIUM SERPL-SCNC: 134 MMOL/L — LOW (ref 135–145)
WBC # BLD: 8.4 K/UL — SIGNIFICANT CHANGE UP (ref 3.8–10.5)
WBC # FLD AUTO: 8.4 K/UL — SIGNIFICANT CHANGE UP (ref 3.8–10.5)

## 2022-11-08 PROCEDURE — 85610 PROTHROMBIN TIME: CPT

## 2022-11-08 PROCEDURE — 85730 THROMBOPLASTIN TIME PARTIAL: CPT

## 2022-11-08 PROCEDURE — 70450 CT HEAD/BRAIN W/O DYE: CPT | Mod: MD

## 2022-11-08 PROCEDURE — 70450 CT HEAD/BRAIN W/O DYE: CPT | Mod: 26,MD

## 2022-11-08 PROCEDURE — 85025 COMPLETE CBC W/AUTO DIFF WBC: CPT

## 2022-11-08 PROCEDURE — 80053 COMPREHEN METABOLIC PANEL: CPT

## 2022-11-08 PROCEDURE — U0003: CPT

## 2022-11-08 PROCEDURE — U0005: CPT

## 2022-11-08 PROCEDURE — 83690 ASSAY OF LIPASE: CPT

## 2022-11-08 PROCEDURE — 99284 EMERGENCY DEPT VISIT MOD MDM: CPT | Mod: FS

## 2022-11-08 PROCEDURE — 99284 EMERGENCY DEPT VISIT MOD MDM: CPT | Mod: 25

## 2022-11-08 PROCEDURE — 36415 COLL VENOUS BLD VENIPUNCTURE: CPT

## 2022-11-08 RX ORDER — ACETAMINOPHEN 500 MG
650 TABLET ORAL ONCE
Refills: 0 | Status: COMPLETED | OUTPATIENT
Start: 2022-11-08 | End: 2022-11-08

## 2022-11-08 RX ADMIN — Medication 650 MILLIGRAM(S): at 23:04

## 2022-11-08 NOTE — ED PROVIDER NOTE - ATTENDING APP SHARED VISIT CONTRIBUTION OF CARE
I, Jose A Guthrie, performed a history and physical exam of the patient and discussed their management with the resident and /or advanced care provider. I reviewed the resident and /or ACP's note and agree with the documented findings and plan of care. I was present and available for all procedures.    See MDM for attestation.

## 2022-11-08 NOTE — ED ADULT TRIAGE NOTE - WEIGHT IN KG
Pt ambulatory to Peds 50. Agree with triage RN note. Instructed to change into gown. Pt alert, pink, interactive and in NAD. Respirations even and unlabored, lungs CTA. Pain reproducible with palpations, worsens with deep inspiration. Displays age appropriate interaction with family and staff. Family at bedside. Call light within reach. Denies additional needs. Up for ERP eval.     63.5

## 2022-11-08 NOTE — ED PROVIDER NOTE - PROGRESS NOTE DETAILS
Sallie LEW: labs and imaging reviewed and discussed with patient. advised to follow up with pcp and neurology regarding today's visit. strict return precautions discussed. stable for discharge. AOX3. steady gait. reports symptoms resolved.

## 2022-11-08 NOTE — ED PROVIDER NOTE - RAPID ASSESSMENT
72 F w/ no pmh w/ past surgical hx of brain for tumor (benign), breast cosmetic, here w/ 2 days of headache, that pt describes as excruitiating that wraps around the whole head, and after 2PM today the pain was worsening, applied ice to the head w/ minimal improvement,no weakness no numbness in arms and legs, no slurred speech, no vision changes, no cp, no coughing, + vomit x1 episode- no blood, no stomach pain. pt is intermittently holding her head, is moving all extremities, ambulatory w/ steady gait    ap- Pt to be sent to main ED for further evaluation - all orders placed to be followed by attending physician in main ED.

## 2022-11-08 NOTE — ED PROVIDER NOTE - NSFOLLOWUPINSTRUCTIONS_ED_ALL_ED_FT
follow up with neurology and your primary care doctor regarding today's visit  bring a copy of all results with you to your follow up appointments  you can take Tylenol for pain   if your symptoms worsen, persist, or if any new symptoms develop, or if you experience any signs of distress, return to the ER right away.    [facility]  Neurology  [address] [place] [address] [address] [place] [address]  Phone: [phone]  Fax:   Follow Up Time: 1-3 Days

## 2022-11-08 NOTE — ED PROVIDER NOTE - NS ED ATTENDING STATEMENT MOD
This was a shared visit with the DANITZA. I reviewed and verified the documentation and independently performed the documented:
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
## 2022-11-08 NOTE — ED PROVIDER NOTE - OBJECTIVE STATEMENT
71 y/o female, past surgical hx of brain for tumor (benign), breast cosmetic, presents to the ER with complaint of HA. states HA started in the morning and became worse around mid-afternoon. states pain located to posterior aspect of her head. admits to one episode of nbnb emesis. denies f/d, CP, SOB, LOC, numbness, tingling, dizziness, slurred speech, facial droop, change in vision. denies coughing, denies abdominal pain.

## 2022-11-08 NOTE — ED PROVIDER NOTE - NSFOLLOWUPCLINICS_GEN_ALL_ED_FT
[facility]  Neurology  [address] [place] [address] [address] [place] [address]  Phone: [phone]  Fax:   Follow Up Time: 1-3 Days

## 2022-11-08 NOTE — ED PROVIDER NOTE - PATIENT PORTAL LINK FT
You can access the FollowMyHealth Patient Portal offered by [facility] by registering at the following website: [email or web address]. By joining [employer]’s FollowMyHealth portal, you will also be able to view your health information using other applications (apps) compatible with our system.

## 2022-11-09 VITALS
TEMPERATURE: 98 F | RESPIRATION RATE: 16 BRPM | HEART RATE: 86 BPM | DIASTOLIC BLOOD PRESSURE: 96 MMHG | OXYGEN SATURATION: 100 % | SYSTOLIC BLOOD PRESSURE: 150 MMHG

## 2022-11-09 LAB — SARS-COV-2 RNA SPEC QL NAA+PROBE: SIGNIFICANT CHANGE UP

## 2022-11-09 NOTE — ED ADULT NURSE NOTE - OBJECTIVE STATEMENT
72 y.o female c/o HA since this AM. Denies dizziness, changes in vision, weakness, numbness/tingling, CP, SOB, fevers. Pt A&Ox3, speech clear. Endorses one episode of vomiting today. States HA has mildly improved. Denies PMH/meds

## 2022-12-12 ENCOUNTER — APPOINTMENT (OUTPATIENT)
Dept: INTERNAL MEDICINE | Facility: CLINIC | Age: 72
End: 2022-12-12
Payer: MEDICARE

## 2022-12-12 VITALS
HEART RATE: 74 BPM | RESPIRATION RATE: 12 BRPM | BODY MASS INDEX: 25.13 KG/M2 | SYSTOLIC BLOOD PRESSURE: 143 MMHG | WEIGHT: 128 LBS | DIASTOLIC BLOOD PRESSURE: 85 MMHG | TEMPERATURE: 97.3 F | OXYGEN SATURATION: 100 % | HEIGHT: 60 IN

## 2022-12-12 DIAGNOSIS — M54.2 CERVICALGIA: ICD-10-CM

## 2022-12-12 DIAGNOSIS — R06.00 DYSPNEA, UNSPECIFIED: ICD-10-CM

## 2022-12-12 PROCEDURE — 99214 OFFICE O/P EST MOD 30 MIN: CPT

## 2023-01-02 LAB
ALBUMIN SERPL ELPH-MCNC: 4.2 G/DL
ALP BLD-CCNC: 102 U/L
ALT SERPL-CCNC: 16 U/L
ANION GAP SERPL CALC-SCNC: 11 MMOL/L
AST SERPL-CCNC: 27 U/L
BILIRUB SERPL-MCNC: 0.4 MG/DL
BUN SERPL-MCNC: 19 MG/DL
CALCIUM SERPL-MCNC: 10 MG/DL
CHLORIDE SERPL-SCNC: 104 MMOL/L
CO2 SERPL-SCNC: 24 MMOL/L
CREAT SERPL-MCNC: 1.05 MG/DL
EGFR: 56 ML/MIN/1.73M2
ESTIMATED AVERAGE GLUCOSE: 126 MG/DL
GLUCOSE SERPL-MCNC: 85 MG/DL
HBA1C MFR BLD HPLC: 6 %
POTASSIUM SERPL-SCNC: 4.3 MMOL/L
PROT SERPL-MCNC: 7.5 G/DL
SODIUM SERPL-SCNC: 139 MMOL/L

## 2023-01-02 NOTE — PHYSICAL EXAM
[No Acute Distress] : no acute distress [Well Nourished] : well nourished [Normal Sclera/Conjunctiva] : normal sclera/conjunctiva [Normal Outer Ear/Nose] : the outer ears and nose were normal in appearance [Normal Oropharynx] : the oropharynx was normal [No JVD] : no jugular venous distention [No Lymphadenopathy] : no lymphadenopathy [Supple] : supple [No Respiratory Distress] : no respiratory distress  [No Accessory Muscle Use] : no accessory muscle use [Clear to Auscultation] : lungs were clear to auscultation bilaterally [Normal Rate] : normal rate  [Regular Rhythm] : with a regular rhythm [Normal S1, S2] : normal S1 and S2 [No Murmur] : no murmur heard [No Edema] : there was no peripheral edema [Soft] : abdomen soft [Non Tender] : non-tender [Non-distended] : non-distended [Normal Anterior Cervical Nodes] : no anterior cervical lymphadenopathy [No CVA Tenderness] : no CVA  tenderness [No Rash] : no rash [Coordination Grossly Intact] : coordination grossly intact [No Focal Deficits] : no focal deficits [Normal Gait] : normal gait [Normal Affect] : the affect was normal [Alert and Oriented x3] : oriented to person, place, and time [Normal Insight/Judgement] : insight and judgment were intact [de-identified] : ROM are within normal limits and without pain at the C-spine.

## 2023-01-02 NOTE — HISTORY OF PRESENT ILLNESS
[de-identified] : Patient is a 72 year old female with history of high cholesterol, high risk for diabetes, presents for follow up visit\par \par She c/o SOB, difficulty taking deep breaths, states that " I feel like my lungs are not filling up", finds it hard to catch her breath. Feels dizzy and lightheaded when rising up from bending over to pick things up from the floor.Says she s worried that these symptoms may have something to do with the operation  she had 12 to 15 years ago ( had benign brain tumor removed )\par Pt. states she also can feel fatty lumps in her back that are tender to the touch. \par She continues to c/o of intermittent pain in the back of the neck that down to about her bra line.\par Denies numbness / tingling to the upper extremities, head.\par She was referred to PT on her last visit in October, however pt says she never went\par Denies CP, palpitations, N/V/D, abdominal pain.\par \par \par

## 2023-01-02 NOTE — ASSESSMENT
[FreeTextEntry1] : \par Dyspnea\par EKG: NSR\par had seen pulmonary-last visit in September- notes reviewed\par \par Cervical pain\par Motrin prn\par referred for PT on her last visit- pt says she never went- new referral given today\par \par HLD\par cont low cholesterol diet/ increased physical activity \par we'll check lipids today\par \par High risk diabetes\par reinforced Low carb, low sugar diet/ increased physical activity\par we'll check glucose andf HgA1c today\par \par blood work ordered\par \par follow up in one week for lab results\par

## 2023-01-03 LAB
CHOLEST SERPL-MCNC: 208 MG/DL
HDLC SERPL-MCNC: 49 MG/DL
LDLC SERPL CALC-MCNC: 128 MG/DL
NONHDLC SERPL-MCNC: 159 MG/DL
TRIGL SERPL-MCNC: 157 MG/DL

## 2023-01-05 ENCOUNTER — APPOINTMENT (OUTPATIENT)
Dept: INTERNAL MEDICINE | Facility: CLINIC | Age: 73
End: 2023-01-05

## 2023-05-02 ENCOUNTER — APPOINTMENT (OUTPATIENT)
Dept: NEUROLOGY | Facility: CLINIC | Age: 73
End: 2023-05-02

## 2023-05-17 ENCOUNTER — APPOINTMENT (OUTPATIENT)
Dept: INTERNAL MEDICINE | Facility: CLINIC | Age: 73
End: 2023-05-17

## 2023-05-30 NOTE — PATIENT PROFILE ADULT. - REASON FOR ADMISSION
Care Due:                  Date            Visit Type   Department     Provider  --------------------------------------------------------------------------------                                EP -                              PRIMARY      [facility] INTER[facility]  Last Visit: 08-      CARE ([facility])   MEDICINE       Reynaeula Littleb  Next Visit: None Scheduled  None         None Found                                                            Last  Test          Frequency    Reason                     Performed    Due Date  --------------------------------------------------------------------------------    Office Visit  12 months..  pantoprazole, simvastatin  08- 07-    [facility] Embedded Care Due Messages. Reference number: [id number].   5/30/2023 3:28:05 PM CDT  
"I had a really bad stomach ache, they told me it was food poisoning."

## 2023-06-21 NOTE — CONSULT NOTE ADULT - CONSULT REQUESTED BY NAME
Tari Hull  [address] [facility] [place] [address]    Dear Tari Hull,    Please see the below instructions for your upcoming procedure(s).    COLONOSCOPY PREPARATION AND INSTRUCTIONS    Please note  It is your responsibility to verify if the procedure is covered by your insurance.  If it is not covered you will be responsible for payment.  If you need to CANCEL or RESCHEDULE, please call [phone] at least 3 DAYS prior to your scheduled procedure    INFORM YOUR PHYSICIAN IF YOU TAKE COUMADIN, PLAVIX OR ANY OTHER BLOOD THINNER  iNFORM YOUR PHYSICIAN IF YOU TAKE MEDICATIONS USED FOR DIABETES AND/OR WEIGHT LOSS  IF YOU TAKE ASPIRIN, IT IS OK TO CONTINUE TAKING     NO NUTS, SEEDS, CORN OR FIBER SUPPLEMENTS THREE DAYS PRIOR TO YOUR PROCEDURE    Items you will need in preparation for your procedure(s):  1-Suprep Bowel prep kit.  This is a prescription sent to your pharmacy you will need to pick-up  2-Purchase one 1 oz bottle of OTC Simethicone drops (infant gas relief drops), available over the counter  3-Purchase four 5mg Dulcolax oral tablets, available over the counter                                      DAY BEFORE EXAM:  August 2nd     If you take Insulin: Take half your normal dose of long acting insulin.  Take your sliding scale insulin as you normally would.  If you take an oral (pill) diabetic,weight loss medication, please let us know as further instruction is needed  Okay to take all other regular medications.      1.  Start a clear liquid diet from the time you wake up until 3 hours before the start time of your procedure.    CLEAR LIQUIDS MAY INCLUDE strained broth, fruit flavored drinks  (apple juice, white grape juice, etc.)  soda pop including melissa, coffee, tea, gelatin, fruit ices, popsicles, and hard candy. NO SOLID FOOD, NO MILK PRODUCTS OR SHERBETS, AVOID ANYTHING RED OR PURPLE.     2.  Take two Dulcolax tablets at 12 noon and again at 5:00 pm    (First Dose)  3.  Complete Steps 1-4  ER using One 6oz bottle of solution at 5:00 pm    Step 1 Pour ONE 6oz bottle of Suprep liquid into the mixing container and add 3/4 teaspoon of Simethicone drops   Step 2 Add cool drinking water to the 16oz line on the container and mix.   Step 3 Drink ALL the liquid in the container.    Step 4 IMPORTANT: You must drink two more 16oz containers of water over the next hour.    DAY OF EXAM:  August 3rd  If you take Insulin: Take your sliding scale insulin only as you normally would.  If you take an or (pill) diabetic/weightloss medication please let us know as further instruction is needed  Take blood pressure medications or any other essential medication at least 3 before the start time of your procedure.     (Second Dose)  1.  At 6:00am-----5 hours before the start time of your procedure repeat steps 1-4.  Be sure to complete ALL 4 STEPS on this dose.   2.  By 8:00am-----Nothing to eat or drink 3 hours before the start time of your procedure.   3.  [place] [facility] in the PATIENT INTAKE registration area, located on the main floor of the hospital one hour before the start time of your procedure at 10:00am.  4. Your procedure is scheduled to start at 11:00am..  Plan on being at the hospital for a total of 3 hours from the time you arrive.   5. Due to sedation you will need to have a  take you home.GI lab policy states you may not take a cab/Uber/Lyft home.  6. If you have HMO insurance, our office will generate your referral.   7. If you should have any questions or difficulties please call the office and ask to speak with a clinical associate during office hours (9-5). If the office is closed the answering service will contact the physician on call.    IF YOU HAVE A CHANGE IN HEALTH STATUS BETWEEN SCHEULING AND THE DATE OF YOUR PROCEDURE PLEASE LET US KNOW.      What you need to know when scheduling your Procedure!  Routine screening, Well-visit, Preventative care,   Personal History, Family  History      Please contact your insurance carrier BEFORE YOUR EXAM to learn about the possible differences in your benefits.    1.  Our charges are billed according to the American Medical Association's regulations, using the same code books as all insurance carriers.  Although we submit the appropriate codes, including indicating screening procedures, each insurance carrier is unique in how they may process your claim.    2.  The definition of a screening colonoscopy is a colonoscopy performed when the patient has no medical indication for the procedure.  'No medical indication' means you have NOT had rectal bleeding, blood in your stool, change in bowel habits, abdominal pain or any other symptom that would indicate you need a procedure.      3.  A diagnosis of a family or personal history of colon polyps or colon cancer may be considered 'screening' by some insurance carriers while others insurance carriers may consider this a 'diagnostic' procedure; in which case your deductible may apply.  We do not know this in advance.    4.  When you come to the office and tell your doctor you have no symptoms AND have a screening benefit, we schedule your procedure as a screening colonoscopy.  When your claim is submitted to your insurance company, we are required to bill with the finding after your colonoscopy is complete.  This means if you had no symptoms and we  scheduled you for screening colonoscopy BUT a condition was found during your procedure (such as a polyp, diverticulosis, colitis, etc.),  we will bill your insurance carrier with the diagnosis of the condition found as well as for screening colonoscopy.  This may mean your claim is processed under your diagnostic or surgical benefits; not routine screening.  We cannot determine how your insurance carrier will process it.    5.  If you have a normal exam with no biopsies taken and no conditions found your insurance carrier may process this as a screening exam.   Every insurance carrier is different and we cannot determine how your claim will be processed until it has been submitted to them.    6.  Be aware that some policies offer screening benefits with a set monetary limit which can be very low.  If your screening benefit is exhausted, you may be responsible for charges not covered by that benefit.    7.  Some insurance carriers may deny payment your screening colonoscopy based on your age, although you may have medical indications for the procedure.  Each insurance carrier handles these claims differently and do not tell us in advance how they will process your claim.  They only tell us how we must bill your claim.    8.  Neither the hospital nor our office can change codes after they have been billed in order to obtain claim payment.  This is considered fraud.  Your medical record needs to support such a change.    Thank you,    [facility]/Gastroenterology

## 2023-07-05 NOTE — ED ADULT NURSE NOTE - FINAL NURSING ELECTRONIC SIGNATURE
Reason for consult:   Subjective:      Patient ID: Nelly Tian is a 69 y.o. female.    Chief Complaint:: Hospital Follow Up    HPI Nelly Tian is a 69 y.o. female  well known to me with prior diagnosis of recurrent anterior abdominal wall cellulitis, fungal groin infection, AFib, a flutter, CHF, COPD, DM with good hemoglobin A1c, anxiety, depression, came in complaining of left abdominal wall redness, pain, warmth, progressively worse, same as with prior cellulitis.       She feels that IV had infiltrated and she may have not received all of the vancomycin dose prescribed..  I had seen patient last year and she received at least 3 months of chronic suppression therapy with cefadroxil.  We discussed that this time around we may need to go for a longer duration.    She is up-to-date with tetanus vaccine.    She is uncomfortable in the hospital and would like to go home as soon as she can.  I do not think she is able for discharge today or probably not ready until Monday.     06/26/2023.  Left abdominal wall cellulitis is markedly improved.  So is the yeast infection in the groin.  No fever no chills.  No new complaints.  Patient is ready to go home.  -----------------------------------------------------------------------------  Above from hospital.    Below clinic  ---------------------------------------------------------------------------------    Patient is taking clindamycin as prescribed.  She does not have any diarrhea.  She is about to finish clindamycin.  She kept the appointment today as we talk about further plan to prevent this recurrent left abdominal wall cellulitis.    We discussed keeping it dry, applying different ointments, Diflucan daily for a week then weekly.    We discussed, after completing treatment for cellulitis with clindamycin, we taper down to cefadroxil for chronic suppression therapy for probably 6 months.        Review of patient's allergies indicates:   Allergen  Reactions    Dilaudid [hydromorphone] Anaphylaxis     Other reaction(s): Anaphylaxis  Other reaction(s): Unknown    Zyvox [linezolid in dextrose 5%] Shortness Of Breath     Past Medical History:   Diagnosis Date    *Atrial flutter     Angina pectoris 2017    Anxiety     Arthritis     Asthma     Atrial fibrillation     Back pain     Cataract     OD    Chest pain 2017    CHF (congestive heart failure)     Chronically on benzodiazepine therapy 2019    COPD (chronic obstructive pulmonary disease)     Depression     Diabetes mellitus     Emphysema of lung     Heart failure     Hepatomegaly 2/3/2016    Hernia     History of MI (myocardial infarction) 2016    Hypercapnic respiratory failure, chronic 2016    Hyperlipidemia     Hypertension     Iron deficiency anemia 2/3/2016    Myocardial infarction     Obesity     Peripheral vascular disease     Pneumonia     Polyneuropathy     Retinal detachment     OS    Septic shock 2017    Skin ulcer 3/18/2017    Tobacco dependence     Type II or unspecified type diabetes mellitus with neurological manifestations, not stated as uncontrolled(250.60)      Past Surgical History:   Procedure Laterality Date    BREAST BIOPSY Left 10 yrs ago    benign    CATARACT EXTRACTION Left     OS      SECTION      x2    EYE SURGERY      HYSTERECTOMY      partial    OOPHORECTOMY      one ovary conserved    RETINAL DETACHMENT SURGERY      buckle --OS    TONSILLECTOMY       Social History     Tobacco Use    Smoking status: Former     Packs/day: 0.30     Years: 50.00     Pack years: 15.00     Types: Cigarettes     Start date: 1968     Quit date: 2022     Years since quittin.0    Smokeless tobacco: Never   Substance Use Topics    Alcohol use: No     Alcohol/week: 0.0 standard drinks        Hunting:  Fishing:  Pets:  Exposure to sick contacts:  Exposure to TB:  Travel:     Family History   Problem Relation Age of Onset    Alcohol abuse Mother     Cirrhosis  Mother     Arthritis Father     Heart disease Father     Heart attack Father     Arrhythmia Father     Coronary artery disease Father     Coronary artery disease Sister     Early death Sister 30        heart disease    Heart disease Sister 32        MI    Heart attack Sister     Arthritis Sister     Heart disease Sister     Crohn's disease Sister     Cancer Brother         Lung cancer    Lung cancer Brother     No Known Problems Brother     No Known Problems Daughter     Blindness Son         due to accident//    Breast cancer Neg Hx     Glaucoma Neg Hx     Macular degeneration Neg Hx     Retinal detachment Neg Hx     Amblyopia Neg Hx     Diabetes Neg Hx     Cataracts Neg Hx     Hypertension Neg Hx     Strabismus Neg Hx     Stroke Neg Hx     Thyroid disease Neg Hx        Review of Systems   Constitutional:  Negative for appetite change, chills, diaphoresis, fatigue, fever and unexpected weight change.   HENT:  Negative for congestion, dental problem, ear pain, hearing loss, mouth sores, nosebleeds, postnasal drip, rhinorrhea, sinus pain, sore throat and trouble swallowing.    Eyes:  Negative for photophobia, pain and visual disturbance.   Respiratory:  Negative for cough, choking, chest tightness and shortness of breath.    Cardiovascular:  Negative for chest pain, palpitations and leg swelling.   Gastrointestinal:  Negative for abdominal pain, anal bleeding, blood in stool, constipation, diarrhea, nausea, rectal pain and vomiting.   Endocrine: Negative for polydipsia and polyuria.   Genitourinary:  Negative for difficulty urinating, dysuria, flank pain, frequency, genital sores, hematuria, urgency and vaginal discharge.   Musculoskeletal:  Negative for arthralgias, back pain, joint swelling and myalgias.   Skin:  Negative for rash.   Allergic/Immunologic: Negative for environmental allergies, food allergies and immunocompromised state.   Neurological:  Negative for dizziness, syncope, speech difficulty, weakness,  "numbness and headaches.   Hematological:  Negative for adenopathy. Does not bruise/bleed easily.   Psychiatric/Behavioral:  Negative for dysphoric mood and sleep disturbance. The patient is not nervous/anxious.       Pertinent medications noted:     Objective:      Blood pressure 136/68, pulse (!) 50, temperature 98.2 °F (36.8 °C), height 5' 8" (1.727 m), weight 124 kg (273 lb 6.4 oz), SpO2 97 %.  Body mass index is 41.57 kg/m².  Physical Exam  Constitutional:       General: She is not in acute distress.     Appearance: She is not diaphoretic.   HENT:      Head: Atraumatic.      Right Ear: External ear normal.      Left Ear: External ear normal.      Nose: Nose normal.   Eyes:      General: No scleral icterus.     Conjunctiva/sclera: Conjunctivae normal.      Pupils: Pupils are equal, round, and reactive to light.   Neck:      Vascular: No JVD.   Cardiovascular:      Rate and Rhythm: Normal rate and regular rhythm.      Heart sounds: Normal heart sounds.   Pulmonary:      Effort: Pulmonary effort is normal.      Breath sounds: Normal breath sounds. No wheezing or rales.   Chest:      Chest wall: No tenderness.   Abdominal:      General: Bowel sounds are normal. There is no distension.      Palpations: Abdomen is soft. There is no mass.      Tenderness: There is no abdominal tenderness. There is no guarding or rebound.      Comments: Big abdominal wall, pannus on the left side.   Musculoskeletal:         General: Normal range of motion.      Cervical back: Normal range of motion and neck supple.      Comments: Walks with a walker.    Decreased muscle mass in lower extremities.   Lymphadenopathy:      Cervical: No cervical adenopathy.   Skin:     General: Skin is warm and dry.      Findings: No erythema or rash.      Comments: Cellulitis is resolved.    Yeast dermatitis is markedly improving.   Neurological:      Mental Status: She is alert and oriented to person, place, and time.      Cranial Nerves: No cranial nerve " deficit.   Psychiatric:         Behavior: Behavior normal.         Thought Content: Thought content normal.       VAD:     General Labs reviewed:  Lab Results   Component Value Date    WBC 7.72 06/26/2023    RBC 4.35 06/26/2023    HGB 12.8 06/26/2023    HCT 40.8 06/26/2023    MCV 94 06/26/2023    MCH 29.4 06/26/2023    MCHC 31.4 (L) 06/26/2023    RDW 13.5 06/26/2023     06/26/2023    MPV 10.4 06/26/2023    GRAN 4.0 06/26/2023    GRAN 51.3 06/26/2023    LYMPH 1.5 06/26/2023    LYMPH 19.4 06/26/2023    MONO 1.4 (H) 06/26/2023    MONO 18.5 (H) 06/26/2023    EOS 0.7 (H) 06/26/2023    BASO 0.07 06/26/2023    EOSINOPHIL 9.5 (H) 06/26/2023    BASOPHIL 0.9 06/26/2023     CMP  Sodium   Date Value Ref Range Status   06/26/2023 138 136 - 145 mmol/L Final     Potassium   Date Value Ref Range Status   06/26/2023 4.7 3.5 - 5.1 mmol/L Final     Chloride   Date Value Ref Range Status   06/26/2023 100 95 - 110 mmol/L Final     CO2   Date Value Ref Range Status   06/26/2023 29 23 - 29 mmol/L Final     Glucose   Date Value Ref Range Status   06/26/2023 97 70 - 110 mg/dL Final     BUN   Date Value Ref Range Status   06/26/2023 15 8 - 23 mg/dL Final     Creatinine   Date Value Ref Range Status   06/26/2023 0.6 0.5 - 1.4 mg/dL Final     Calcium   Date Value Ref Range Status   06/26/2023 9.2 8.7 - 10.5 mg/dL Final     Total Protein   Date Value Ref Range Status   06/26/2023 6.6 6.0 - 8.4 g/dL Final     Albumin   Date Value Ref Range Status   06/26/2023 3.1 (L) 3.5 - 5.2 g/dL Final     Total Bilirubin   Date Value Ref Range Status   06/26/2023 0.5 0.1 - 1.0 mg/dL Final     Comment:     For infants and newborns, interpretation of results should be based  on gestational age, weight and in agreement with clinical  observations.    Premature Infant recommended reference ranges:  Up to 24 hours.............<8.0 mg/dL  Up to 48 hours............<12.0 mg/dL  3-5 days..................<15.0 mg/dL  6-29 days.................<15.0 mg/dL        Alkaline Phosphatase   Date Value Ref Range Status   06/26/2023 65 55 - 135 U/L Final     AST   Date Value Ref Range Status   06/26/2023 27 10 - 40 U/L Final     ALT   Date Value Ref Range Status   06/26/2023 24 10 - 44 U/L Final     Anion Gap   Date Value Ref Range Status   06/26/2023 9 8 - 16 mmol/L Final     eGFR   Date Value Ref Range Status   06/26/2023 >60 >60 mL/min/1.73 m^2 Final       Micro:  Microbiology Results (last 7 days)       ** No results found for the last 168 hours. **          Imaging Reviewed:    Assessment:       1. Abdominal wall cellulitis    2. Recurrent cellulitis    3. Yeast dermatitis           Plan:       Abdominal wall cellulitis  -     cefadroxil (DURICEF) 500 MG Cap; Take 1 capsule (500 mg total) by mouth every 12 (twelve) hours.  Dispense: 180 capsule; Refill: 1    Recurrent cellulitis  -     cefadroxil (DURICEF) 500 MG Cap; Take 1 capsule (500 mg total) by mouth every 12 (twelve) hours.  Dispense: 180 capsule; Refill: 1    Yeast dermatitis  -     fluconazole (DIFLUCAN) 100 MG tablet; Take 2 tablets (200 mg total) by mouth once daily. For 1 week then weekly  Dispense: 60 tablet; Refill: 0      Follow up in about 2 months (around 9/5/2023).        Patient is pleasant, compliant.  She tries to do the right thing but it is extremely hard to keep the left groin dry.     We discussed again, how to keep right groin trying, wash it, dried, dried with a blow dryer, put a pillow case to absorb any moisture; may try different ointments--1 at a time, Diflucan daily for a week then weekly.    We discussed, after completing treatment for cellulitis with clindamycin, we taper down to cefadroxil for chronic suppression therapy for probably 6 months.     This note was created using  voice recognition software that occasionally misinterpreted phrases or words.     09-Nov-2022 03:01

## 2023-07-26 NOTE — ED PROVIDER NOTE - CHIEF COMPLAINT
The patient is a 66y Female complaining of Report endorsed to gerry agarwal RN for my break coverage. Report given to covering RN and patient informed during rounding Safety checks compld this shift/Safety rounds completed hourly.

## 2023-08-21 NOTE — ED ADULT NURSE NOTE - TEMPLATE LIST FOR HEAD TO TOE ASSESSMENT
Spoke w/pt. She confirmed she uses Rebif syringes. Per 8/18/23 LOV note:  Considering patient stable clinical and radiographic findings, patient was advised to consider Rebif 44 mcg 3 times a week versus 2 times a week for 1 month and then transition to Rebif 44 mcg twice a week for 3 months as we will be very gradually tapering down Rebif at this point considering immune senescence and related questions. Patient continue describing injection reaction with flulike symptoms recommend taking ibuprofen    Script pended below. Dr. Trina Wyatt - please sign if agreeable. Thanks! Cardiac

## 2023-10-11 ENCOUNTER — APPOINTMENT (OUTPATIENT)
Dept: INTERNAL MEDICINE | Facility: CLINIC | Age: 73
End: 2023-10-11
Payer: MEDICARE

## 2023-10-11 VITALS
HEIGHT: 60 IN | HEART RATE: 70 BPM | WEIGHT: 132 LBS | RESPIRATION RATE: 12 BRPM | OXYGEN SATURATION: 98 % | BODY MASS INDEX: 25.91 KG/M2 | DIASTOLIC BLOOD PRESSURE: 82 MMHG | SYSTOLIC BLOOD PRESSURE: 132 MMHG

## 2023-10-11 DIAGNOSIS — Z00.00 ENCOUNTER FOR GENERAL ADULT MEDICAL EXAMINATION W/OUT ABNORMAL FINDINGS: ICD-10-CM

## 2023-10-11 PROCEDURE — G0439: CPT

## 2023-10-11 PROCEDURE — G0444 DEPRESSION SCREEN ANNUAL: CPT | Mod: 59

## 2023-11-02 NOTE — PATIENT PROFILE ADULT. - NUTRITION PROFILE
Assessment/Plan:    Chronic deep vein thrombosis (DVT) of femoral vein of right lower extremity [facility])  49-year-old female with HTN, DM, migraines, hypothyroid, uterine cancer s/p hysterectomy, provoked LLE DVT '00 after urostomy surgery, R posterior tibial DVT in April '23 with propagation to R mid to distal femoral vein DVT June '23 (Xarelto failure/switched to pradaxa), left achilles tendoitis, chronic BLE pain. Patient returns to the office to review LEVD 10/30/23      -Ongoing BLE pain. L>R lower extremity swelling   -No improvement in discomfort with compression   -Planned for left achilles repair with podiatry at end of the month   -LEVD showed chronic R femoral and PT DVT and no LLE DVT; triphasic arterial waveforms   -Continue long-term anticoagulation due to recurrent DVT  -Patient also following with hematology and recommendations were given for preoperative anticoagulation management  -Continue compression  -Additionally periodic leg elevation and calf pump exercises  -Follow-up in the office in 6 months to recheck       Diagnoses and all orders for this visit:    Chronic deep vein thrombosis (DVT) of femoral vein of right lower extremity (HCC)  -     Compression Stocking      Subjective:      Patient ID: Joel Duran is a 75 y.o. female. Patinet presents to review the LEV done 10/30/23. Pt reports pain and swelling in both legs, L>R. She has been wearing compression and elevating her legs. Pt is taking Pradaxa and is a former smoker. HPI  49-year-old female with HTN, DM, migraines, hypothyroid, uterine cancer s/p hysterectomy, provoked LLE DVT '00 after urostomy surgery, R posterior tibial DVT in April '23 with propagation to R mid to distal femoral vein DVT June '23 (Xarelto failure/switched to pradaxa), left achilles tendoitis, chronic BLE pain. Patient returns to the office to review LEVD 10/30/23  Patient initially referred by hematology for right lower extremity pain.   Right lower extremity pain multifactorial.  Patient now mainly complaining of left ankle pain and swelling - she is planned for Achilles surgical repair with podiatry at the end of the month. She is wearing compression stockings without any significant improvement in lower extremity symptoms. Repeat venous duplex showed chronic right femoral and posterior tibial thrombosis, no acute findings of the right or left lower extremity. The following portions of the patient's history were reviewed and updated as appropriate: allergies, current medications, past family history, past medical history, past social history, past surgical history, and problem list.  ROS reviewed     Review of Systems   Cardiovascular:  Positive for leg swelling. All other systems reviewed and are negative. Objective:    I have reviewed and made appropriate changes to the review of systems input by the medical assistant.     Vitals:    11/02/23 0919   BP: 130/88   BP Location: Left arm   Patient Position: Sitting   Cuff Size: Standard   Pulse: 84   Weight: 100 kg (221 lb)   Height: 5' 6" (1.676 m)       Patient Active Problem List   Diagnosis    Small bowel obstruction (HCC)    Type 2 diabetes mellitus ([address])    History of urostomy    Endometrial cancer (HCC)    Hypothyroidism    In vitro fertilization    Anemia    Hydroureteronephrosis    HTN (hypertension)    Migraine    Abdominal adhesions    Tooth infection    Class 2 obesity in adult    Headache    Ureteral-ileal loop anastomotic stricture    Chronic deep vein thrombosis (DVT) of femoral vein of right lower extremity (HCC)    Bone infarction (HCC)    Anti-cardiolipin antibody positive    Encounter for attention to other artificial openings of urinary tract (HCC)    Leg pain, anterior, right    Nausea and vomiting    Hypercalcemia    Vertigo    History of smoking    Decreased appetite       Past Surgical History:   Procedure Laterality Date    ACHILLES TENDON REPAIR Right     APPENDECTOMY COLONOSCOPY      20    COLONOSCOPY W/ BIOPSIES N/A 2015    EGD      HEEL SPUR SURGERY Right     HYSTERECTOMY      ILEO CONDUIT      due to urinary radiation injury    IR NEPHROSTOMY TUBE PLACEMENT  2021    LAPAROSCOPIC GASTRIC BANDING N/A     [place], [place]    OTHER SURGICAL HISTORY      Urine shunt to intestine, transverse colon    PCNL Left 2021    Procedure: NEPHROLITHOTOMY  PERCUTANEOUS (PCNL); Surgeon: Brennan Orellana MD;  Location: [facility];  Service: Urology    KS CYSTO/URETERO W/LITHOTRIPSY &INDWELL STENT INSRT Left 2021    Procedure: anterograde  URETEROSCOPY with dilation of ureteral stricture, INSERTION STENT URETERAL, exchange  of nephrostomy tube; Surgeon: Brennan Orellana MD;  [facility];  Service: Urology    KS LAPAROSCOPY ENTEROLYSIS SEPARATE PROCEDURE N/A 2019    Procedure: LAPAROSCOPIC LYSIS OF ADHESIONS;  Surgeon: Hugo Silva MD;  Location: [address] [place] [address] MAIN OR;  Service: [address] [place], [place] [address]for endometrial cancer.     SLEEVE GASTROPLASTY N/A 2015    Dr. Mona Logan, [facility]    URETER REVISION      [facility]         Family History   Problem Relation Age of Onset    No Known Problems Mother     Kidney failure Father     Brain cancer Father     Kidney cancer Father     Diabetes Sister     Alcohol abuse Sister     Diabetes Brother        Social History     Socioeconomic History    Marital status: /Civil Union     Spouse name: Omar Colbert    Number of children: 0    Years of education: 14    Highest education level: Not on file   Occupational History    Occupation: Hair styist    Tobacco Use    Smoking status: Former     Packs/day: 1.50     Years: 10.00     Total pack years: 15.00     Types: Cigarettes     Quit date: 1986     Years since quittin.8    Smokeless tobacco: Former   Vaping Use    Vaping Use: Never used   Substance and Sexual Activity Alcohol use: Yes     Comment: Rare Socially    Drug use: No    Sexual activity: Never     Comment: s/p hysterectomy   Other Topics Concern    Not on file   Social History Narrative    Not on file     Social Determinants of Health     Financial Resource Strain: Not on file   Food Insecurity: Not on file   Transportation Needs: No Transportation Needs (6/26/2023)    PRAPARE - Transportation     Lack of Transportation (Medical): No     Lack of Transportation (Non-Medical):  No   Physical Activity: Not on file   Stress: Not on file   Social Connections: Not on file   Intimate Partner Violence: Not on file   Housing Stability: Unknown (6/26/2023)    Housing Stability Vital Sign     Unable to Pay for Housing in the Last Year: No     Number of Places Lived in the Last Year: Not on file     Unstable Housing in the Last Year: No       Allergies   Allergen Reactions    Amoxicillin Rash    Penicillins Rash    Adhesive [Medical Tape] Rash         Current Outpatient Medications:     ALPRAZolam (XANAX) 0.5 mg tablet, Take 0.25 mg by mouth daily at bedtime as needed Takes 1/2 0.5mg = 0.25 mg @ bedtime prn, Disp: , Rfl: 2    Ascorbic Acid (VITAMIN C PO), Take by mouth daily Only in winters, Disp: , Rfl:     BIOTIN PO, Take by mouth daily, Disp: , Rfl:     dabigatran etexilate (PRADAXA) 150 mg capsu, Take 1 capsule (150 mg total) by mouth every 12 (twelve) hours, Disp: 180 capsule, Rfl: 0    famotidine (PEPCID) 20 mg tablet, Take 1 tablet (20 mg total) by mouth 2 (two) times a day, Disp: 60 tablet, Rfl: 0    FREESTYLE LITE test strip, , Disp: , Rfl:     meclizine (ANTIVERT) 12.5 MG tablet, Take 1 tablet (12.5 mg total) by mouth every 8 (eight) hours for 10 days, Disp: 30 tablet, Rfl: 0    metFORMIN (GLUCOPHAGE) 500 mg tablet, , Disp: , Rfl:     oxyCODONE (ROXICODONE) 5 immediate release tablet, Take 1 tablet (5 mg total) by mouth every 4 (four) hours as needed for severe pain Max Daily Amount: 30 mg, Disp: 30 tablet, Rfl: 0 pantoprazole (PROTONIX) 40 mg tablet, Take 1 tablet (40 mg total) by mouth daily in the early morning, Disp: 90 tablet, Rfl: 0    pramipexole (MIRAPEX) 0.5 mg tablet, , Disp: , Rfl:     Probiotic Product (PROBIOTIC DAILY PO), Take by mouth, Disp: , Rfl:     SYNTHROID 75 MCG tablet, Take 75 mcg by mouth daily in the early morning  , Disp: , Rfl:     Trulicity 4.5 FF/0.9WU SOPN, INJECT UNDER THE SKIN 4.5 MG WEEKLY AS DIRECTED, Disp: , Rfl:     diclofenac sodium (VOLTAREN) 50 mg EC tablet, Take 1 tablet (50 mg total) by mouth 2 (two) times a day (Patient not taking: Reported on 11/2/2023), Disp: 5 tablet, Rfl: 0    HYDROcodone-acetaminophen (Norco) 5-325 mg per tablet, Take 1 tablet by mouth every 12 (twelve) hours as needed for pain Max Daily Amount: 2 tablets (Patient not taking: Reported on 11/2/2023), Disp: 15 tablet, Rfl: 0    lisinopril (ZESTRIL) 10 mg tablet, Take 1 tablet (10 mg total) by mouth daily for 10 days, Disp: 10 tablet, Rfl: 0    metFORMIN (GLUCOPHAGE) 1000 MG tablet, Take 0.5 tablets (500 mg total) by mouth 2 (two) times a day with meals (Patient not taking: Reported on 10/10/2023), Disp: 180 tablet, Rfl: 0    methylPREDNISolone 4 MG tablet therapy pack, Use as directed on package, Disp: 21 tablet, Rfl: 0    mupirocin (BACTROBAN) 2 % ointment, APPLY TOPICALLY 2 (TWO) TIMES A DAY. APPLY TO NARES (Patient not taking: Reported on 7/27/2023), Disp: , Rfl:     naloxone (NARCAN) 4 mg/0.1 mL nasal spray, Administer 1 spray into a nostril. If no response after 2-3 minutes, give another dose in the other nostril using a new spray. (Patient not taking: Reported on 11/2/2023), Disp: 1 each, Rfl: 1    /88 (BP Location: Left arm, Patient Position: Sitting, Cuff Size: Standard)   Pulse 84   Ht 5' 6" (1.676 m)   Wt 100 kg (221 lb)   BMI 35.67 kg/m²          Physical Exam  Vitals and nursing note reviewed. Constitutional:       Appearance: Normal appearance.    HENT:      Head: Normocephalic and atraumatic. Eyes:      Extraocular Movements: Extraocular movements intact. Cardiovascular:      Pulses:           Dorsalis pedis pulses are 2+ on the right side and 2+ on the left side. Heart sounds: Normal heart sounds. Pulmonary:      Effort: Pulmonary effort is normal.   Abdominal:      General: Bowel sounds are normal.      Palpations: Abdomen is soft. Musculoskeletal:         General: Swelling present. Normal range of motion. Comments: L>R ankle swelling    Skin:     General: Skin is warm. Neurological:      General: No focal deficit present. Mental Status: She is alert and oriented to person, place, and time.    Psychiatric:         Mood and Affect: Mood normal.         Behavior: Behavior normal. no indicators present

## 2023-11-07 NOTE — CONSULT NOTE ADULT - TIME-BASED BILLING (NON-CRITICAL CARE)
If you are a smoker, it is important for your health to stop smoking. Please be aware that second hand smoke is also harmful. Time-based billing (NON-critical care)

## 2024-02-13 NOTE — ED CDU PROVIDER NOTE - CADM POA PRESS ULCER
Instructions for your surgery at [facility]      Today's Date:  1/30/2024      Patient's Name:  Ann-Marie Chang           Surgery Date:  02/13/2024              Please enter the main entrance of the hospital and check-in at the  located in the lobby. Once checked in at the , you will take the elevators to the second floor, and report to the waiting room on the left. The room will say Procedure Registration.    Do NOT eat or drink anything, including candy, gum, or ice chips after midnight prior to your surgery, unless you have specific instructions from your surgeon or anesthesia provider to do so.  Brush your teeth before coming to the hospital. You may swish with water, but do not swallow.  No smoking/Vaping/E-Cigarettes 24 hours prior to the day of surgery.  No alcohol 24 hours prior to the day of surgery.  No recreational drugs for one week prior to the day of surgery.  Bring Photo ID, Insurance information, and Co-pay if required on day of surgery.  Bring in pertinent legal documents, such as, Medical Power of , DNR, Advance Directive, etc.  Leave all valuables, including money/purse, at home.  Remove all jewelry, including ALL body piercings, nail polish, acrylic nails, and makeup (including mascara); no lotions, powders, deodorant, or perfume/cologne/after shave on the skin.  Follow instruction for Hibiclens washes and CHG wipes from surgeon's office.   Glasses and dentures may be worn to the hospital. They must be removed prior to surgery. Please bring case/container for glasses or dentures.   Contact lenses should not be worn on day of surgery.   Call your doctor's office if symptoms of a cold or illness develop within 24-48 hours prior to your surgery.  Call your doctor's office if you have any questions concerning insurance or co-pays.  15. AN ADULT (relative or friend 18 years or older) MUST DRIVE YOU HOME AFTER YOUR SURGERY.  16. Please make 
Patient /Family /Designee has been informed that [facility] is not responsible for patient belongings per policy and the signed [facility] Patient Agreement document.  Personal items should be sent home or checked in with security.  Patient /Family /Designee selected the following action:                            [x]  Send personal items home with a family member or friend                                                 []  Check in personal items with security, excluding clothing                            []  Maintain personal items at the bedside, against recommendation                                 by Malcolm Ackerman [facility]                                   ** If patient /family /designee chooses to maintain personal items at the bedside,                                      Complete the patient belongings inventory in the EMR.    
No

## 2024-04-15 ENCOUNTER — APPOINTMENT (OUTPATIENT)
Dept: INTERNAL MEDICINE | Facility: CLINIC | Age: 74
End: 2024-04-15
Payer: MEDICARE

## 2024-04-15 VITALS
WEIGHT: 134 LBS | OXYGEN SATURATION: 98 % | BODY MASS INDEX: 26.31 KG/M2 | HEART RATE: 89 BPM | RESPIRATION RATE: 12 BRPM | HEIGHT: 60 IN

## 2024-04-15 VITALS — DIASTOLIC BLOOD PRESSURE: 84 MMHG | SYSTOLIC BLOOD PRESSURE: 130 MMHG

## 2024-04-15 DIAGNOSIS — M54.50 LOW BACK PAIN, UNSPECIFIED: ICD-10-CM

## 2024-04-15 DIAGNOSIS — Z91.89 OTHER SPECIFIED PERSONAL RISK FACTORS, NOT ELSEWHERE CLASSIFIED: ICD-10-CM

## 2024-04-15 DIAGNOSIS — E78.00 PURE HYPERCHOLESTEROLEMIA, UNSPECIFIED: ICD-10-CM

## 2024-04-15 DIAGNOSIS — M54.9 DORSALGIA, UNSPECIFIED: ICD-10-CM

## 2024-04-15 DIAGNOSIS — M41.9 SCOLIOSIS, UNSPECIFIED: ICD-10-CM

## 2024-04-15 PROCEDURE — 99214 OFFICE O/P EST MOD 30 MIN: CPT

## 2024-04-15 NOTE — HISTORY OF PRESENT ILLNESS
[de-identified] : Ms. ROSALINE PLASCENCIA is a 73 year old female with a Hx of elevated cholesterol, presents for a follow up visit.  She continues to c/o back pain, this time the pain is in her lower back with radiation to LE. Takes Motrin, which she states helps with the pain a little.  Denies any SOB, CP, abdominal pain, N/V/D, headache, dizziness, or leg swelling.

## 2024-04-15 NOTE — PHYSICAL EXAM
[No Acute Distress] : no acute distress [Well Nourished] : well nourished [Well Developed] : well developed [Well-Appearing] : well-appearing [Normal Sclera/Conjunctiva] : normal sclera/conjunctiva [Normal Outer Ear/Nose] : the outer ears and nose were normal in appearance [No JVD] : no jugular venous distention [No Lymphadenopathy] : no lymphadenopathy [Supple] : supple [No Respiratory Distress] : no respiratory distress  [No Accessory Muscle Use] : no accessory muscle use [Clear to Auscultation] : lungs were clear to auscultation bilaterally [Normal Rate] : normal rate  [Regular Rhythm] : with a regular rhythm [Normal S1, S2] : normal S1 and S2 [No Murmur] : no murmur heard [Pedal Pulses Present] : the pedal pulses are present [No Edema] : there was no peripheral edema [No Extremity Clubbing/Cyanosis] : no extremity clubbing/cyanosis [Soft] : abdomen soft [Non Tender] : non-tender [Non-distended] : non-distended [Normal Posterior Cervical Nodes] : no posterior cervical lymphadenopathy [Normal Anterior Cervical Nodes] : no anterior cervical lymphadenopathy [No Joint Swelling] : no joint swelling [Grossly Normal Strength/Tone] : grossly normal strength/tone [No Rash] : no rash [Coordination Grossly Intact] : coordination grossly intact [No Focal Deficits] : no focal deficits [Normal Gait] : normal gait [Normal Affect] : the affect was normal [Normal Insight/Judgement] : insight and judgment were intact [de-identified] : +scoliosis of spine, concave left

## 2024-04-15 NOTE — ASSESSMENT
[FreeTextEntry1] : Follow up  Upper and low back pain Motrin/Advil prn Referred for PT  Slightly elevated cholesterol Reinforced the importance of following a low cholesterol/low fat diet and increased physical activity. We'll check Lipids and LFTs today  High risk for DM Reinforced the importance of following a low sugar/low carbohydrate diet We'll check glucose and HbA1c today.  Blood work ordered. Follow up in one week for lab results

## 2024-04-15 NOTE — ADDENDUM
[FreeTextEntry1] : I, Apoorva Garcia, am scribing for and in the presence of Dr. Fatou Crowe, DO in the following sections HISTORY OF PRESENT ILLNESS, PAST MEDICAL/FAMILY/SOCIAL HISTORY; REVIEW OF SYSTEMS; VITAL SIGNS; PHYSICAL EXAM; ASSESSMENT/PLAN on 04/15/2024.   I personally performed the services described in the documentation, reviewed the documentation recorded by the scribe in my presence, and it accurately and completely records my words and actions.

## 2024-04-22 LAB
ALBUMIN SERPL ELPH-MCNC: 4.5 G/DL
ALP BLD-CCNC: 110 U/L
ALT SERPL-CCNC: 15 U/L
ANION GAP SERPL CALC-SCNC: 13 MMOL/L
AST SERPL-CCNC: 26 U/L
BILIRUB SERPL-MCNC: 0.4 MG/DL
BUN SERPL-MCNC: 22 MG/DL
CALCIUM SERPL-MCNC: 10 MG/DL
CHLORIDE SERPL-SCNC: 102 MMOL/L
CHOLEST SERPL-MCNC: 223 MG/DL
CO2 SERPL-SCNC: 24 MMOL/L
CREAT SERPL-MCNC: 1.06 MG/DL
EGFR: 55 ML/MIN/1.73M2
GLUCOSE SERPL-MCNC: 117 MG/DL
HDLC SERPL-MCNC: 62 MG/DL
LDLC SERPL CALC-MCNC: 140 MG/DL
NONHDLC SERPL-MCNC: 161 MG/DL
POTASSIUM SERPL-SCNC: 4.2 MMOL/L
PROT SERPL-MCNC: 7.8 G/DL
SODIUM SERPL-SCNC: 139 MMOL/L
TRIGL SERPL-MCNC: 118 MG/DL

## 2024-04-22 NOTE — ED ADULT NURSE NOTE - BREATHING, MLM
Health Maintenance       COVID-19 Vaccine (5 - 2023-24 season)  Overdue since 9/1/2023           Following review of the above:  Patient is not proceeding with: COVID-19    Note: Refer to final orders and clinician documentation.         Spontaneous, unlabored and symmetrical

## 2024-05-28 LAB
ESTIMATED AVERAGE GLUCOSE: 126 MG/DL
HBA1C MFR BLD HPLC: 6 %

## 2024-10-15 ENCOUNTER — LABORATORY RESULT (OUTPATIENT)
Age: 74
End: 2024-10-15

## 2024-10-15 ENCOUNTER — APPOINTMENT (OUTPATIENT)
Dept: INTERNAL MEDICINE | Facility: CLINIC | Age: 74
End: 2024-10-15

## 2024-10-15 VITALS
WEIGHT: 132 LBS | BODY MASS INDEX: 25.91 KG/M2 | RESPIRATION RATE: 12 BRPM | HEIGHT: 60 IN | SYSTOLIC BLOOD PRESSURE: 140 MMHG | HEART RATE: 78 BPM | TEMPERATURE: 97.3 F | DIASTOLIC BLOOD PRESSURE: 80 MMHG | OXYGEN SATURATION: 99 %

## 2024-10-15 DIAGNOSIS — Z00.00 ENCOUNTER FOR GENERAL ADULT MEDICAL EXAMINATION W/OUT ABNORMAL FINDINGS: ICD-10-CM

## 2024-10-15 DIAGNOSIS — R68.89 OTHER GENERAL SYMPTOMS AND SIGNS: ICD-10-CM

## 2024-10-15 LAB
25(OH)D3 SERPL-MCNC: 36.8 NG/ML
ALBUMIN SERPL ELPH-MCNC: 4.4 G/DL
ALP BLD-CCNC: 107 U/L
ALT SERPL-CCNC: 12 U/L
ANION GAP SERPL CALC-SCNC: 10 MMOL/L
APPEARANCE: CLEAR
AST SERPL-CCNC: 27 U/L
BASOPHILS # BLD AUTO: 0.07 K/UL
BASOPHILS NFR BLD AUTO: 0.8 %
BILIRUB SERPL-MCNC: 0.4 MG/DL
BILIRUBIN URINE: NEGATIVE
BLOOD URINE: ABNORMAL
BUN SERPL-MCNC: 21 MG/DL
CALCIUM SERPL-MCNC: 10.4 MG/DL
CHLORIDE SERPL-SCNC: 101 MMOL/L
CHOLEST SERPL-MCNC: 217 MG/DL
CO2 SERPL-SCNC: 27 MMOL/L
COLOR: YELLOW
CREAT SERPL-MCNC: 1.08 MG/DL
EGFR: 54 ML/MIN/1.73M2
EOSINOPHIL # BLD AUTO: 0.15 K/UL
EOSINOPHIL NFR BLD AUTO: 1.7 %
ESTIMATED AVERAGE GLUCOSE: 120 MG/DL
GLUCOSE QUALITATIVE U: NEGATIVE MG/DL
GLUCOSE SERPL-MCNC: 101 MG/DL
HBA1C MFR BLD HPLC: 5.8 %
HCT VFR BLD CALC: 40 %
HDLC SERPL-MCNC: 59 MG/DL
HGB BLD-MCNC: 12.6 G/DL
IMM GRANULOCYTES NFR BLD AUTO: 0.2 %
KETONES URINE: NEGATIVE MG/DL
LDLC SERPL CALC-MCNC: 122 MG/DL
LEUKOCYTE ESTERASE URINE: NEGATIVE
LYMPHOCYTES # BLD AUTO: 2.11 K/UL
LYMPHOCYTES NFR BLD AUTO: 23.3 %
MAN DIFF?: NORMAL
MCHC RBC-ENTMCNC: 30.2 PG
MCHC RBC-ENTMCNC: 31.5 GM/DL
MCV RBC AUTO: 95.9 FL
MONOCYTES # BLD AUTO: 0.57 K/UL
MONOCYTES NFR BLD AUTO: 6.3 %
NEUTROPHILS # BLD AUTO: 6.14 K/UL
NEUTROPHILS NFR BLD AUTO: 67.7 %
NITRITE URINE: NEGATIVE
NONHDLC SERPL-MCNC: 159 MG/DL
PH URINE: 7
PLATELET # BLD AUTO: 297 K/UL
POTASSIUM SERPL-SCNC: 5 MMOL/L
PROT SERPL-MCNC: 7.9 G/DL
PROTEIN URINE: NEGATIVE MG/DL
RBC # BLD: 4.17 M/UL
RBC # FLD: 13.1 %
SODIUM SERPL-SCNC: 138 MMOL/L
SPECIFIC GRAVITY URINE: 1.01
TRIGL SERPL-MCNC: 211 MG/DL
TSH SERPL-ACNC: 1.55 UIU/ML
UROBILINOGEN URINE: 0.2 MG/DL
VIT B12 SERPL-MCNC: 536 PG/ML
WBC # FLD AUTO: 9.06 K/UL

## 2024-10-15 PROCEDURE — G0439: CPT

## 2024-10-15 PROCEDURE — G0444 DEPRESSION SCREEN ANNUAL: CPT | Mod: 59

## 2024-10-15 RX ORDER — DONEPEZIL HYDROCHLORIDE 10 MG/1
10 TABLET ORAL
Qty: 90 | Refills: 1 | Status: ACTIVE | COMMUNITY
Start: 2024-10-15

## 2024-11-18 ENCOUNTER — NON-APPOINTMENT (OUTPATIENT)
Age: 74
End: 2024-11-18

## 2024-11-21 ENCOUNTER — APPOINTMENT (OUTPATIENT)
Dept: INTERNAL MEDICINE | Facility: CLINIC | Age: 74
End: 2024-11-21

## 2024-11-21 VITALS
RESPIRATION RATE: 12 BRPM | HEART RATE: 75 BPM | HEIGHT: 60 IN | WEIGHT: 133 LBS | DIASTOLIC BLOOD PRESSURE: 80 MMHG | BODY MASS INDEX: 26.11 KG/M2 | OXYGEN SATURATION: 99 % | SYSTOLIC BLOOD PRESSURE: 128 MMHG

## 2024-11-21 DIAGNOSIS — M54.9 DORSALGIA, UNSPECIFIED: ICD-10-CM

## 2024-11-21 DIAGNOSIS — Z91.89 OTHER SPECIFIED PERSONAL RISK FACTORS, NOT ELSEWHERE CLASSIFIED: ICD-10-CM

## 2024-11-21 DIAGNOSIS — E78.00 PURE HYPERCHOLESTEROLEMIA, UNSPECIFIED: ICD-10-CM

## 2024-11-21 PROCEDURE — 99214 OFFICE O/P EST MOD 30 MIN: CPT

## 2024-12-09 NOTE — ED ADULT NURSE NOTE - CAS ELECT INFOMATION PROVIDED
Pt reviewed results via [employer].     Sent pt [employer] msg advising of results and recommendations. Advised to call if any questions.     Colonoscopy placed in  and recall for 11/15/29.       teaching provided by VIPIN Sterling

## 2025-03-24 ENCOUNTER — APPOINTMENT (OUTPATIENT)
Dept: INTERNAL MEDICINE | Facility: CLINIC | Age: 75
End: 2025-03-24
Payer: MEDICARE

## 2025-03-24 VITALS
WEIGHT: 133 LBS | OXYGEN SATURATION: 97 % | TEMPERATURE: 97.5 F | HEIGHT: 60 IN | BODY MASS INDEX: 26.11 KG/M2 | RESPIRATION RATE: 12 BRPM | HEART RATE: 84 BPM

## 2025-03-24 VITALS — SYSTOLIC BLOOD PRESSURE: 140 MMHG | DIASTOLIC BLOOD PRESSURE: 78 MMHG

## 2025-03-24 DIAGNOSIS — E78.00 PURE HYPERCHOLESTEROLEMIA, UNSPECIFIED: ICD-10-CM

## 2025-03-24 DIAGNOSIS — Z91.89 OTHER SPECIFIED PERSONAL RISK FACTORS, NOT ELSEWHERE CLASSIFIED: ICD-10-CM

## 2025-03-24 DIAGNOSIS — R68.89 OTHER GENERAL SYMPTOMS AND SIGNS: ICD-10-CM

## 2025-03-24 LAB
ALBUMIN SERPL ELPH-MCNC: 4.3 G/DL
ALP BLD-CCNC: 128 U/L
ALT SERPL-CCNC: 8 U/L
ANION GAP SERPL CALC-SCNC: 13 MMOL/L
AST SERPL-CCNC: 21 U/L
BILIRUB SERPL-MCNC: 0.4 MG/DL
BUN SERPL-MCNC: 26 MG/DL
CALCIUM SERPL-MCNC: 9.9 MG/DL
CHLORIDE SERPL-SCNC: 101 MMOL/L
CHOLEST SERPL-MCNC: 230 MG/DL
CO2 SERPL-SCNC: 25 MMOL/L
CREAT SERPL-MCNC: 1 MG/DL
EGFRCR SERPLBLD CKD-EPI 2021: 58 ML/MIN/1.73M2
ESTIMATED AVERAGE GLUCOSE: 123 MG/DL
GLUCOSE SERPL-MCNC: 96 MG/DL
HBA1C MFR BLD HPLC: 5.9 %
HDLC SERPL-MCNC: 59 MG/DL
LDLC SERPL-MCNC: 145 MG/DL
NONHDLC SERPL-MCNC: 171 MG/DL
POTASSIUM SERPL-SCNC: 4.4 MMOL/L
PROT SERPL-MCNC: 7.6 G/DL
SODIUM SERPL-SCNC: 139 MMOL/L
TRIGL SERPL-MCNC: 146 MG/DL

## 2025-03-24 PROCEDURE — 99214 OFFICE O/P EST MOD 30 MIN: CPT
